# Patient Record
Sex: FEMALE | Race: WHITE | NOT HISPANIC OR LATINO | Employment: OTHER | URBAN - METROPOLITAN AREA
[De-identification: names, ages, dates, MRNs, and addresses within clinical notes are randomized per-mention and may not be internally consistent; named-entity substitution may affect disease eponyms.]

---

## 2023-11-06 ENCOUNTER — ANCILLARY PROCEDURE (OUTPATIENT)
Dept: RADIOLOGY | Facility: CLINIC | Age: 63
End: 2023-11-06
Payer: MEDICARE

## 2023-11-06 VITALS — WEIGHT: 193 LBS | BODY MASS INDEX: 29.25 KG/M2 | HEIGHT: 68 IN

## 2023-11-06 DIAGNOSIS — Z12.31 ENCOUNTER FOR SCREENING MAMMOGRAM FOR MALIGNANT NEOPLASM OF BREAST: ICD-10-CM

## 2023-11-06 DIAGNOSIS — Z13.820 ENCOUNTER FOR SCREENING FOR OSTEOPOROSIS: ICD-10-CM

## 2023-11-06 DIAGNOSIS — Z78.0 ASYMPTOMATIC MENOPAUSAL STATE: ICD-10-CM

## 2023-11-06 PROCEDURE — 77067 SCR MAMMO BI INCL CAD: CPT

## 2023-11-06 PROCEDURE — 77085 DXA BONE DENSITY AXL VRT FX: CPT

## 2023-12-04 NOTE — PROGRESS NOTES
Subjective   Patient ID: Danielle Mason is a 63 y.o. female who presents to office to discuss a colonoscopy.   HPI  Patient referred by Dr. Westbrook to discuss positive cologuard and discuss having a colonoscopy.  Patient is only done Cologuard in the past.  This is her first positive Cologuard.  She does occasionally have constipation.  No blood in the stool no abdominal pain and her weight is stable.  Her brother did have polyps.    Past Medical History:   Diagnosis Date    Hyperlipidemia     Lung granuloma (CMS/HCC)     Tinnitus       Family History   Problem Relation Name Age of Onset    Arthritis Mother Kellie     Cancer Mother Kellie     Diabetes Mother Kellie     Hypertension Mother Kellie     Kidney disease Mother Kellie     Vision loss Mother Kellie     Thyroid cancer Mother Kellie     Atrial fibrillation Father Nixon     Hearing loss Father Nixon     Kidney disease Father Nixon     Stroke Father Nixon     Kidney cancer Daughter Mandy     Breast cancer Maternal Grandmother Chela 75      Past Surgical History:   Procedure Laterality Date    CT GUIDED PERCUTANEOUS BIOPSY LUNG  01/10/2022    CT GUIDED PERCUTANEOUS BIOPSY LUNG 1/10/2022 [facility]    HYSTERECTOMY      OTHER SURGICAL HISTORY  11/10/2021    Tubal ligation      No Known Allergies   Review of Systems   Constitutional:  Negative for appetite change, fever and unexpected weight change.   HENT:  Negative for congestion and trouble swallowing.    Respiratory:  Negative for cough and shortness of breath.    Cardiovascular:  Negative for chest pain and palpitations.   Gastrointestinal:  Negative for abdominal pain, diarrhea and nausea.   Genitourinary:  Negative for difficulty urinating and dysuria.   Musculoskeletal:  Negative for back pain and gait problem.   Skin:  Negative for color change and rash.   Neurological:  Negative for dizziness, speech difficulty and headaches.   Hematological:  Does not bruise/bleed easily.    Psychiatric/Behavioral:  Negative for confusion and suicidal ideas.        Objective   Physical Exam  Physical exam generic :  GENERAL APPEARANCE: Patient appears in no acute distress.   EYES: Sclera non-icteric, PERRLA.   ENT Normal appearance of ears and nose.   NECK/THYROID: Neck: no masses. Thyroid: no masses.   LYMPH NODES: No cervical or supraclavicular lymphadenopathy.   CARDIOVASCULAR Heart: RRR, no murmurs; Carotid bruits: none; Peripheral edema: none.   RESPIRATORY: Lungs: clear to auscultation bilaterally; no respiratory distress.   GI (ABDOMEN) No intraabdominal mass appreciated, no hepatosplenomegaly; Hernia: none; Tenderness: none.   PSYCH: Patient oriented to time, place and person, normal affect.    Assessment/Plan   Problem List Items Addressed This Visit             ICD-10-CM    Abnormal stool test - Primary R19.5     Patient with positive Cologuard test.  Recommend colonoscopy.  Risk benefits alternatives of doing the colonoscopy were thoroughly discussed with the patient agrees to proceed.  The bowel prep will be reviewed by the scheduling RN         Relevant Orders    Colonoscopy Diagnostic

## 2023-12-05 ENCOUNTER — OFFICE VISIT (OUTPATIENT)
Dept: SURGERY | Facility: CLINIC | Age: 63
End: 2023-12-05
Payer: MEDICARE

## 2023-12-05 VITALS
OXYGEN SATURATION: 97 % | DIASTOLIC BLOOD PRESSURE: 90 MMHG | HEIGHT: 67 IN | SYSTOLIC BLOOD PRESSURE: 134 MMHG | HEART RATE: 74 BPM | RESPIRATION RATE: 20 BRPM | WEIGHT: 193 LBS | TEMPERATURE: 98.4 F | BODY MASS INDEX: 30.29 KG/M2

## 2023-12-05 DIAGNOSIS — R19.5 ABNORMAL STOOL TEST: Primary | ICD-10-CM

## 2023-12-05 PROCEDURE — 99214 OFFICE O/P EST MOD 30 MIN: CPT | Performed by: SURGERY

## 2023-12-05 PROCEDURE — 99204 OFFICE O/P NEW MOD 45 MIN: CPT | Performed by: SURGERY

## 2023-12-05 PROCEDURE — 1159F MED LIST DOCD IN RCRD: CPT | Performed by: SURGERY

## 2023-12-05 PROCEDURE — 1036F TOBACCO NON-USER: CPT | Performed by: SURGERY

## 2023-12-05 PROCEDURE — 1126F AMNT PAIN NOTED NONE PRSNT: CPT | Performed by: SURGERY

## 2023-12-05 RX ORDER — IBUPROFEN 200 MG
1 CAPSULE ORAL DAILY
COMMUNITY

## 2023-12-05 RX ORDER — CALCITRIOL 0.25 UG/1
0.25 CAPSULE ORAL DAILY
COMMUNITY

## 2023-12-05 RX ORDER — HYDROCHLOROTHIAZIDE 12.5 MG/1
12.5 TABLET ORAL DAILY
COMMUNITY

## 2023-12-05 RX ORDER — ROSUVASTATIN CALCIUM 10 MG/1
10 TABLET, COATED ORAL DAILY
COMMUNITY

## 2023-12-05 ASSESSMENT — PATIENT HEALTH QUESTIONNAIRE - PHQ9
SUM OF ALL RESPONSES TO PHQ9 QUESTIONS 1 & 2: 0
1. LITTLE INTEREST OR PLEASURE IN DOING THINGS: NOT AT ALL
2. FEELING DOWN, DEPRESSED OR HOPELESS: NOT AT ALL

## 2023-12-05 ASSESSMENT — ENCOUNTER SYMPTOMS
COLOR CHANGE: 0
SPEECH DIFFICULTY: 0
HEADACHES: 0
ABDOMINAL PAIN: 0
PALPITATIONS: 0
UNEXPECTED WEIGHT CHANGE: 0
DIZZINESS: 0
TROUBLE SWALLOWING: 0
COUGH: 0
FEVER: 0
NAUSEA: 0
BACK PAIN: 0
BRUISES/BLEEDS EASILY: 0
DYSURIA: 0
DIARRHEA: 0
SHORTNESS OF BREATH: 0
APPETITE CHANGE: 0
CONFUSION: 0
DIFFICULTY URINATING: 0

## 2023-12-05 ASSESSMENT — PAIN SCALES - GENERAL: PAINLEVEL: 0-NO PAIN

## 2023-12-05 NOTE — ASSESSMENT & PLAN NOTE
Patient with positive Cologuard test.  Recommend colonoscopy.  Risk benefits alternatives of doing the colonoscopy were thoroughly discussed with the patient agrees to proceed.  The bowel prep will be reviewed by the scheduling RN

## 2024-01-10 NOTE — PROGRESS NOTES
C/C:  Follow up visit    History Of Present Illness  Danielle Mason is a 63 y.o. female presenting for follow up of a lung nodule. She is a life long non-smoker.     Since last visit has been doing well, denies any respiratory issues. No recent weight loss. Patient awaiting her colonoscopy later today.     By way of review: patient was found to have a left lower lobe lung nodule incidentally on CT cardiac scoring test. She underwent follow up CT chest and PET scan at [facility], [place]. PET showed m minimal avidity in the nodule itself and no distal uptake (including lymph nodes). 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 She denies any respiratory complaints including no SOB, chest pain, or other. No recent fevers/chills or abnormal weight loss     Past Medical History  She has a past medical history of Hyperlipidemia, Lung granuloma (CMS/HCC), and Tinnitus.    Social History  She reports that she has never smoked. She has never used smokeless tobacco. She reports current alcohol use of about 2.0 standard drinks of alcohol per week. She reports that she does not use drugs.      Medications    Current Outpatient Medications:     calcitriol (Rocaltrol) 0.25 mcg capsule, Take 1 capsule (0.25 mcg) by mouth once daily., Disp: , Rfl:     calcium citrate (Calcitrate) 200 mg (950 mg) tablet, Take 1 tablet (200 mg) by mouth once daily., Disp: , Rfl:     hydroCHLOROthiazide (HYDRODiuril) 12.5 mg tablet, Take 1 tablet (12.5 mg) by mouth once daily., Disp: , Rfl:     rosuvastatin (Crestor) 10 mg tablet, Take 1 tablet (10 mg) by mouth once daily., Disp: , Rfl:     Allergies  Patient has no known allergies.    Review of Systems:  Review of Systems   Constitutional: No fevers, chills, unexpected weight change  HENT: No sore throat, congestion, or nasal drainage  Eyes: No visual changes or eye itching  Respiratory: see HPI. No cough, worsening dyspnea, wheezing  Cardiac: No chest pain,  palpitations, or lower extremity edema  Gastrointestinal: No nausea, vomiting, diarrhea. No abdominal pain  Genitourinary: No dysuria or hematuria  Musculoskeletal: No back pain. No significant myalgias or arthralgias  Neurologic: No headaches, dizziness, or seizures.  Hematologic: No east bleeding or bruising.  Psychiatric: No anxiety or depression.    Physical Exam:  Physical Exam  There were no vitals taken for this visit.  Constitutional:       General: Patient is not in acute distress.     Appearance: Normal appearance; not ill-appearing.   Neurological:      General: No focal deficit present.      Mental Status: Patient is alert and oriented to person, place, and time.   Psychiatric:         Mood and Affect: Mood normal.       Relevant Results:     Pathology:    Accession #: [id number]            Pathologist:                   WADAD S. MNEIMNEH, MD  Date of Procedure:    1/10/2022  Date Received:          1/10/2022  Date Reported           1/14/2022  Submitting Physician:   JESSICA LAUREN DO  Location:                      Copy To/Referring/Attending:  HARLEY ROSE M.D. Other External #                                        FINAL DIAGNOSIS  LUNG, LEFT LOWER LOBE, MASS, CORE BIOPSY:    -- FIBROELASTOTIC CHANGES WITH MILD INFLAMMATION AND MINUTE POORLY FORMED  NONNECROTIZING GRANULOMA (SEE NOTE).  Note: GMS and AFB stains were performed on the biopsy. No microorganisms were  identified.  The above changes are focal and are not specific. While they may be  representative of the lesion, they could be also reactive changes adjacent to  an unsampled process. Additional sampling is recommended if clinically  indicated.     Imaging:    CT chest wo IV contrast    Result Date: 1/16/2024  Interpreted By:  Conrad Corea, STUDY: CT CHEST WO IV CONTRAST;  1/15/2024 10:22 am   INDICATION: Signs/Symptoms: 1 year follow up surveilllance CT  R91.1: Lung nodule.   COMPARISON: 01/17/2023   ACCESSION NUMBER(S):  [id number]   ORDERING CLINICIAN: JESISCA LAUREN   TECHNIQUE: Helical data acquisition of the chest was obtained without IV contrast material.  Images were reformatted in axial, coronal, and sagittal planes.   FINDINGS: LUNGS AND AIRWAYS: Mild patchy bibasilar infiltrates and/or atelectasis. Unchanged subpleural lobulated posterolateral left lower lobe nodule measuring up to approximately 1.2 cm image 164, similar in size and now shows some mild internal calcification. Some adjacent abutting smaller nodular densities up to 4 mm image 158 also similar. Unchanged subcentimeter lung nodules elsewhere for example up to 3-4 mm right upper lobe image 47 and posteriorly on the right image 57. Unchanged 4 mm nodule along left major fissure image 116. Mild infiltrate or atelectasis laterally on the right similar to prior. Subcentimeter calcified lung nodules likely granulomas.   No pleural effusions.   MEDIASTINUM AND DENISE, LOWER NECK AND AXILLA: Prominent heterogeneous right thyroid lobe with apparent heterogeneous hypoattenuating nodule up to least 2.4 cm.   Mildly prominent nonspecific paratracheal nodes up to 9 mm short axis. Some prominent calcified left hilar nodes likely sequela of granulomatous disease.   Esophagus appears within normal limits as seen.   HEART AND VESSELS: The thoracic aorta is stable in course and caliber. Mild vascular calcifications.   Main pulmonary artery and its branches are normal in caliber.   No definite coronary artery calcifications are seen. The study is not optimized for evaluation of coronary arteries.   The heart appears stable in size.   No significant pericardial effusion.   UPPER ABDOMEN: Small low-density focus left hepatic lobe too small to characterize but probably of benign etiology. Prominent lamellated gas containing gallstones are redemonstrated. Mildly prominent nonspecific nodes about the descending thoracic aorta up to 6 mm short axis similar to prior.   CHEST WALL AND  "OSSEOUS STRUCTURES: Vertebral body hemangioma at the upper lumbar spine. No definite new suspicious osseous lesions. Mild multilevel degenerative changes visualized spine.       1. Unchanged lung nodules as described measuring up to 1.2 cm at the left lower lobe. Recommend continued follow-up as clinically warranted. Correlation with PET-CT may also be considered for further assessment and comparison with prior study of 11/15/2021. 2. Mild patchy bibasilar infiltrates and/or atelectasis. Mild infiltrate or atelectasis laterally on the right similar to prior. 3. Mildly prominent nonspecific mediastinal nodes as above. 4. Prominent heterogeneous right thyroid lobe with apparent heterogeneous nodule measuring up to at least 2.4 cm. Consider ultrasound for further assessment. 5. Cholelithiasis. 6. Additional findings as above.   MACRO: None   Signed by: Conrad Corea 1/16/2024 1:35 PM Dictation workstation:   [id number]       Pulmonary Functions Testing Results:    No results found for: \"FEV1\", \"FVC\", \"HYJ3PUC\", \"TLC\", \"DLCO\"    CT Chest was personally reviewed     Assessment/Plan   Problem List Items Addressed This Visit             ICD-10-CM    Lung nodule R91.1    Relevant Orders    CT chest wo IV contrast          Danielle Mason is a 63 y.o. female with incidentally discovered left lower lobe lung nodule, approx 9mm in size at the time of initial discovery - now 1.2cm but stable over the last 1+ years, minimally PET avid and prior biopsy c/w non-necrotizing granuloma. Surveillance imaging today showed stable 1.2cm size. Plan is repeat CT chest in 1 year and if it is unchanged again at that time we will discontinue surveillance.       Angeles Cruz, DO  Thoracic & Esophageal Surgery       "

## 2024-01-11 ENCOUNTER — APPOINTMENT (OUTPATIENT)
Dept: SURGERY | Facility: CLINIC | Age: 64
End: 2024-01-11
Payer: MEDICARE

## 2024-01-15 ENCOUNTER — ANCILLARY PROCEDURE (OUTPATIENT)
Dept: RADIOLOGY | Facility: CLINIC | Age: 64
End: 2024-01-15
Payer: MEDICARE

## 2024-01-15 DIAGNOSIS — R91.1 SOLITARY PULMONARY NODULE: ICD-10-CM

## 2024-01-15 PROCEDURE — 71250 CT THORAX DX C-: CPT | Performed by: RADIOLOGY

## 2024-01-15 PROCEDURE — 71250 CT THORAX DX C-: CPT

## 2024-01-16 ENCOUNTER — TELEMEDICINE (OUTPATIENT)
Dept: SURGERY | Facility: CLINIC | Age: 64
End: 2024-01-16
Payer: MEDICARE

## 2024-01-16 DIAGNOSIS — R91.1 LUNG NODULE: ICD-10-CM

## 2024-01-16 PROCEDURE — 99442 PR PHYS/QHP TELEPHONE EVALUATION 11-20 MIN: CPT | Performed by: STUDENT IN AN ORGANIZED HEALTH CARE EDUCATION/TRAINING PROGRAM

## 2024-01-19 PROBLEM — R91.1 LUNG NODULE: Status: ACTIVE | Noted: 2024-01-19

## 2024-11-06 ASSESSMENT — ENCOUNTER SYMPTOMS
FREQUENCY: 1
CONSTIPATION: 1

## 2024-11-08 ENCOUNTER — APPOINTMENT (OUTPATIENT)
Dept: OBSTETRICS AND GYNECOLOGY | Facility: CLINIC | Age: 64
End: 2024-11-08
Payer: MEDICARE

## 2024-11-08 VITALS
BODY MASS INDEX: 29.1 KG/M2 | DIASTOLIC BLOOD PRESSURE: 74 MMHG | WEIGHT: 192 LBS | SYSTOLIC BLOOD PRESSURE: 112 MMHG | HEIGHT: 68 IN

## 2024-11-08 DIAGNOSIS — R35.0 URINE FREQUENCY: ICD-10-CM

## 2024-11-08 DIAGNOSIS — N81.6 RECTOCELE: Primary | ICD-10-CM

## 2024-11-08 LAB
POC BLOOD, URINE: NEGATIVE
POC GLUCOSE, URINE: NEGATIVE MG/DL
POC LEUKOCYTES, URINE: NEGATIVE
POC NITRITE,URINE: NEGATIVE
POC PROTEIN, URINE: NEGATIVE MG/DL

## 2024-11-08 PROCEDURE — 3008F BODY MASS INDEX DOCD: CPT | Performed by: OBSTETRICS & GYNECOLOGY

## 2024-11-08 PROCEDURE — 81002 URINALYSIS NONAUTO W/O SCOPE: CPT | Performed by: OBSTETRICS & GYNECOLOGY

## 2024-11-08 PROCEDURE — 99214 OFFICE O/P EST MOD 30 MIN: CPT | Performed by: OBSTETRICS & GYNECOLOGY

## 2024-11-08 PROCEDURE — 1159F MED LIST DOCD IN RCRD: CPT | Performed by: OBSTETRICS & GYNECOLOGY

## 2024-11-08 ASSESSMENT — ENCOUNTER SYMPTOMS
FREQUENCY: 1
CONSTIPATION: 1

## 2024-11-08 NOTE — PROGRESS NOTES
Subjective   Patient ID: Danielle Mason is a 64 y.o. female who presents for Consult.  Established patient 64 years old.  3 children born vaginally.  Largest 9 pounds.  March 2022 she had a vaginal hysterectomy with anterior colporrhaphy NuPrep presents with 6 weeks of pelvic pressure.  She does have constipation.  Can feel a bulge.  She states that she also has urinary frequency.  NuPrep on exam there is a grade 2-3 posterior vaginal wall prolapse consistent with rectocele.    Review diagrams and etiology.  Review options.  Discussed that pelvic organ prolapse is common after multiple children especially 1 weighing 9 pounds.  On exam today there is excellent support of the vault of the vagina and bladder.  This is mainly a posterior vaginal wall prolapse.  Review options including observation, pessary, pelvic floor physical therapy, surgical correction    This point would recommend pelvic floor physical therapy.  Follow-up in 8 weeks    Female  Problem  The patient's primary symptoms include genital itching and pelvic pain. This is a new problem. The current episode started more than 1 month ago. The problem occurs constantly. The problem has been unchanged. The pain is mild. The problem affects both sides. She is not pregnant. Associated symptoms include constipation, frequency and urgency. The symptoms are aggravated by activity and bowel movements. She is sexually active. No, her partner does not have an STD. She uses tubal ligation for contraception. She is postmenopausal.       Review of Systems   Gastrointestinal:  Positive for constipation.   Genitourinary:  Positive for frequency, pelvic pain and urgency.   All other systems reviewed and are negative.      Objective   Physical Exam  Genitourinary:     General: Normal vulva.      Vagina: Prolapsed vaginal walls present.      Comments: Grade 2-3 rectocele.  Prior vaginal hysterectomy with anterior colporrhaphy.  Excellent apical and anterior wall  support        Assessment/Plan   Trial of pelvic floor physical therapy.  Follow-up 8 weeks         Parag Timmons MD 11/08/24 9:04 AM

## 2024-12-03 ENCOUNTER — EVALUATION (OUTPATIENT)
Dept: PHYSICAL THERAPY | Facility: CLINIC | Age: 64
End: 2024-12-03
Payer: MEDICARE

## 2024-12-03 DIAGNOSIS — R35.0 URINE FREQUENCY: ICD-10-CM

## 2024-12-03 DIAGNOSIS — N81.6 RECTOCELE: ICD-10-CM

## 2024-12-03 PROCEDURE — 97530 THERAPEUTIC ACTIVITIES: CPT | Mod: GP | Performed by: PHYSICAL THERAPIST

## 2024-12-03 PROCEDURE — 97162 PT EVAL MOD COMPLEX 30 MIN: CPT | Mod: GP | Performed by: PHYSICAL THERAPIST

## 2024-12-03 PROCEDURE — 97110 THERAPEUTIC EXERCISES: CPT | Mod: GP | Performed by: PHYSICAL THERAPIST

## 2024-12-03 NOTE — PROGRESS NOTES
Physical Therapy Pelvic Floor Evaluation    Patient Name: Danielle Mason  MRN: [id number]  Evaluation Date: 12/3/2024  Time Calculation  Start Time: 0800  Stop Time: 0900  Time Calculation (min): 60 min  PT Evaluation Time Entry  PT Evaluation (Moderate) Time Entry: 30     PT Therapeutic Procedures Time Entry  Therapeutic Exercise Time Entry: 13  Therapeutic Activity Time Entry: 10         Problem List Items Addressed This Visit             ICD-10-CM    Urine frequency R35.0    Rectocele N81.6        Subjective    Precautions:   No precautions    Pain:     2/10 pain vaginal     PELVIC HISTORY:  Chief Complaint/Description of Symptoms:   Noticed September 2024, began feeling pressure vaginally-- pain, pressure, constipation and urinary frequency.  PCP thought UTI but antibiotics didn't help.  Saw. Dr. Timmons and said rectocele and referred to PFPT prior to surgery.  Currently reports she feels a bulge... more uncomfortable as day goes on.     Past Medical History:    Hysterectomy because uterus prolapse, HTN,   Home Environment/Social Factors/Occupation:   Retired teacher, used to be caregiver to parents.    Patient Primary Goal:   To reduce pain, strengthen pelvic wall and prevent surgery    PELVIC PAIN:     Location: vaginal area-- arch, soreness  2/10  currently.  Better since initial diagnosis  Soreness and pain is worse after intercourse  No lubrication,     MENSTRUAL HISTORY:  Post menopausal    OB HISTORY:  3 -- vaginal deliveries, tore during delivery and had episiotomy;      BLADDER:     Daytime Voiding Frequency: at least 10 times a day  Nighttime Voiding Frequency: usually wakes 2 times   Unintentional urine loss: yes   Leakage occurs with: when waits too long, sneezing, coughing   Leakage amount: small to moderate  Able to completely empty bladder: pees then stops and then leans forward to empty more.    BOWEL:     BM Frequency: 1-3 times a day.  Frequent constipation/straining/incomplete  emptying: constipation, trying to avoid straining.     FLUID/DIET INTAKE:  Drinks water --  Standard american diet    EXERCISE:  Current exercise regime:   Does walk when weather is good, does go to [employer] and treadmill,     Objective   POSTURE/ALIGNMENT:  Reduced lumbar lordosis, + stork with SLS on right;        ROM:  Lumbar ROM Range   Flexion 75%   Extension 50%      MMT:  Hip MMT L R   Hip Flexion 4/5 4/5   Hip Extension 4-/5 4-/5   Hip Abduction 4/5 4/5      TA: poor TA activation with exhale, poor load transfer with active SLR,   Difficulty coordinating breathing     FLEXIBILITY R/L:  Hamstrings: tightness/tightness    PELVIC FLOOR  Patient Position:  hooklying  EXTERNAL OBSERVATION:  Voluntary Contraction: difficulty coordinating contraction   Vulvar Assessment: redness and dry -- no vaginal moisturizer or estogen use    EXTERNAL PALPATION:  Levator Ani: non tender   Bulbo: non tender   Ischiocavernosus: non tender   Transverse perineum: non tender     INTERNAL VAGINAL EXAMINATION WITH PATIENT CONSENT:  Patient position:  hooklying  Internal:  Deep layer:  Left pubococcygeus and iliococcygeus more tender than right  Muscle Excursion  Limited   Strength  2/5  Prolapse  Rectocele 3    Outcome Measures:  NIH-CPSI: 29  Pain: 12  Urinary: 10  Quality of Life Impact: 7     Treatments:  Therapeutic Activity:    Breathing and importance of to pelvic floor  Fiber, and stool softeners  Laying with pillow under buttock to reduce prolapse  Discussed lubricant for intercourse  Therapeutic Exercise:    Exhale with effort  Kegel with exhale   Bridging with exhale   Access Code: [email or web address]  URL: [email or web address]  Date: 12/03/2024  Prepared by: Karina Saenz    Exercises  - Supine Bridge  - 1 x daily - 7 x weekly - 1 sets - 10 reps  - Supine Pelvic Floor Contraction  - 1 x daily - 7 x weekly - 1 sets - 10 reps    OP EDUCATION:  Outpatient Education  Individual(s) Educated: Patient  Education Provided: Anatomy, Home  Exercise Program, POC, Physiology  Risk and Benefits Discussed with Patient/Caregiver/Other: yes  Patient/Caregiver Demonstrated Understanding: yes  Plan of Care Discussed and Agreed Upon: yes  Patient Response to Education: Patient/Caregiver Verbalized Understanding of Information, Patient/Caregiver Performed Return Demonstration of Exercises/Activities, Patient/Caregiver Asked Appropriate Questions    Assessment   PT Assessment Results: Decreased strength, Decreased range of motion, Decreased coordination, Decreased endurance, Pain  Rehab Prognosis: Good  Evaluation/Treatment Tolerance: Patient tolerated treatment well    Pt is a 64 y.o. female who presents with impairments of weakness, pain and impaired coordination with breathing. These impairments have led to functional limitations including rectocele with ADLs and constipation and urinary frequency. Pt would benefit from skilled physical therapy intervention to improve above impairments and facilitate return to function.     Complexity of Evaluation: Moderate    Based on the history including personal factors and/or comorbidities, examination of body systems including body structures and function, activity limitations, and/or participation restrictions, as well as clinical presentation, patient meets criteria for a Moderate complexity evaluation.    Plan:  Treatment/Interventions: Biofeedback, Education/ Instruction, Electrical stimulation, Manual therapy, Neuromuscular re-education, Therapeutic activities, Therapeutic exercises  PT Plan: Skilled PT  PT Frequency: 1 time per week  Duration: 10 sessions  Certification Period Start Date: 12/03/24  Certification Period End Date: 03/03/25  Number of Treatments Authorized: medical necessity  Rehab Potential: Good  Plan of Care Agreement: Patient    Insurance Plan: Payor: [employer] MEDICARE / Plan: [employer]ICARE / Product Type: *No Product type* /     Next session:  strengthening    Goals:     Pelvic Floor      STGs - 5 sessions  1)  Patient will report 25% less pain/symptoms at end of day  2)  Patient will perform diaphragmatic breathing properly to relax and contract pelvic floor muscle appropriately  3)  Patient will demonstrate good transverse abdominis activation to stabilize lumbopelvic girdle during ADLs  4)  Patient will report successful use of urge suppression strategies at least 50% of the time      LTGs - 10 sessions  1)  Patient will report improved sleep with less waking to void (will only wake 1-2 times a night)   2)  Patient will increase tolerance to internal penetration for examination and intercourse with min to nil pain   3)  Patient will reduce urine loss to nil during coughing and sneezing  4)  Patient will be able to have complete bowel movement without constipation or straining 75% of the time  5)  Patient will improve pelvic floor contraction to by 1/2-1 MMT  with coordinated exhale for improved continence and reduction of rectocele  6)  Patient will score 15 points or less on NIH-CPSI to indicate perceived improvement.

## 2024-12-10 ENCOUNTER — APPOINTMENT (OUTPATIENT)
Dept: PHYSICAL THERAPY | Facility: CLINIC | Age: 64
End: 2024-12-10
Payer: MEDICARE

## 2024-12-18 ENCOUNTER — TREATMENT (OUTPATIENT)
Dept: PHYSICAL THERAPY | Facility: CLINIC | Age: 64
End: 2024-12-18
Payer: MEDICARE

## 2024-12-18 DIAGNOSIS — R35.0 URINE FREQUENCY: ICD-10-CM

## 2024-12-18 DIAGNOSIS — N81.6 RECTOCELE: ICD-10-CM

## 2024-12-18 PROCEDURE — 97530 THERAPEUTIC ACTIVITIES: CPT | Mod: GP | Performed by: PHYSICAL THERAPIST

## 2024-12-18 PROCEDURE — 97110 THERAPEUTIC EXERCISES: CPT | Mod: GP | Performed by: PHYSICAL THERAPIST

## 2024-12-18 NOTE — PROGRESS NOTES
Physical Therapy Treatment    Patient Name: Danielle Mason  MRN: [id number]  Encounter date:  12/18/2024  Time Calculation  Start Time: 1130  Stop Time: 1228  Time Calculation (min): 58 min     PT Therapeutic Procedures Time Entry  Therapeutic Exercise Time Entry: 35  Therapeutic Activity Time Entry: 20    Visit Number:  2 (including evaluation)  Planned total visits: 10 per POC  Visits Authorized/Insurance Coverage:  11/26/2024: NO AUTH, 96% [place], [place], [employer]     Current Problem  Problem List Items Addressed This Visit             ICD-10-CM    Urine frequency R35.0    Rectocele N81.6     Precautions     No precautions    Pain     3-4/10 pain irritated    Subjective  General        Feels some soreness, dryness;  Patient also reporting breathing is challenging.      Objective  Cues for breathing     Treatment:  Therapuetic Activity:    Vaginal moisturizer -- list issued of brands  Ways to reduce constipation  Therapeutic Exercise:  with coordinated kegel and breathing  Hip adduction x 10   Hip abduction x 10   Bridging x 10   Supine MIP x 10   Heel raises x 10   Shoulder flexion to 90 x 10   Happy baby modified x 3 x 3 -4 breathes  SKTC x 3 3 breathes  Wide LTR x 10 (cues to limit range of motion)      Current HEP:  Access Code: [email or web address]  URL: [email or web address]  Date: 12/18/2024  Prepared by: Karina Saenz    Exercises  - Pelvic Floor Contractions in Hooklying with Adduction  - 1 x daily - 7 x weekly - 1 sets - 10 reps  - Pelvic Floor Contractions in Hooklying with Resisted Abduction  - 1 x daily - 7 x weekly - 1 sets - 10 reps  - Supine March  - 1 x daily - 7 x weekly - 1 sets - 10 reps  - Supine stretch-- hands on knees  - 1 x daily - 7 x weekly - 1 sets - 3 reps - 3-4 breathes hold  - Single Knee to Chest Stretch  - 1 x daily - 7 x weekly - 1 sets - 3 reps - 3-4 breathes hold  - Supine Hip Internal and External Rotation  - 1 x daily - 7 x weekly - 1 sets - 10 reps  - Heel Raises with Counter  Support  - 1 x daily - 7 x weekly - 1 sets - 10 reps  - Standing Shoulder Flexion to 90 Degrees with Dumbbells  - 1 x daily - 7 x weekly - 1 sets - 10 reps  Access Code: [email or web address]  URL: [email or web address]  Date: 12/03/2024  Prepared by: Karina Saenz     Exercises  - Supine Bridge  - 1 x daily - 7 x weekly - 1 sets - 10 reps  - Supine Pelvic Floor Contraction  - 1 x daily - 7 x weekly - 1 sets - 10 reps    Has patient been compliant with HEP? Yes    OP EDUCATION:     Updated HEP    Assessment:     Pt's response to treatment:  Patient feeling ability to contract pelvic floor more post session.  Pain may be related to friction from slipping of rectocele.  Patient is encouraged to try vaginal moisturizer.  Patient  to speak to GI regarding constipation. Patient considering seeking second opinion regarding need for surgery.      Pain end of session: 2    Plan:     Continue with current POC/no changes    Assessment of current progress against goals:  Progressing toward functional goals    Goals:     Pelvic Floor     STGs - 5 sessions  1)  Patient will report 25% less pain/symptoms at end of day  2)  Patient will perform diaphragmatic breathing properly to relax and contract pelvic floor muscle appropriately  3)  Patient will demonstrate good transverse abdominis activation to stabilize lumbopelvic girdle during ADLs  4)  Patient will report successful use of urge suppression strategies at least 50% of the time      LTGs - 10 sessions  1)  Patient will report improved sleep with less waking to void (will only wake 1-2 times a night)   2)  Patient will increase tolerance to internal penetration for examination and intercourse with min to nil pain   3)  Patient will reduce urine loss to nil during coughing and sneezing  4)  Patient will be able to have complete bowel movement without constipation or straining 75% of the time  5)  Patient will improve pelvic floor contraction to by 1/2-1 MMT  with coordinated  exhale for improved continence and reduction of rectocele  6)  Patient will score 15 points or less on NIH-CPSI to indicate perceived improvement.

## 2025-01-02 ENCOUNTER — HOSPITAL ENCOUNTER (OUTPATIENT)
Dept: RADIOLOGY | Facility: CLINIC | Age: 65
Discharge: HOME | End: 2025-01-02
Payer: MEDICARE

## 2025-01-02 DIAGNOSIS — Z12.31 ENCOUNTER FOR SCREENING MAMMOGRAM FOR MALIGNANT NEOPLASM OF BREAST: ICD-10-CM

## 2025-01-02 PROCEDURE — 77063 BREAST TOMOSYNTHESIS BI: CPT | Performed by: RADIOLOGY

## 2025-01-02 PROCEDURE — 77067 SCR MAMMO BI INCL CAD: CPT

## 2025-01-02 PROCEDURE — 77067 SCR MAMMO BI INCL CAD: CPT | Performed by: RADIOLOGY

## 2025-01-03 ENCOUNTER — TREATMENT (OUTPATIENT)
Dept: PHYSICAL THERAPY | Facility: CLINIC | Age: 65
End: 2025-01-03
Payer: MEDICARE

## 2025-01-03 DIAGNOSIS — R35.0 URINE FREQUENCY: ICD-10-CM

## 2025-01-03 DIAGNOSIS — N81.6 RECTOCELE: ICD-10-CM

## 2025-01-03 PROCEDURE — 97110 THERAPEUTIC EXERCISES: CPT | Mod: GP | Performed by: PHYSICAL THERAPIST

## 2025-01-03 PROCEDURE — 97530 THERAPEUTIC ACTIVITIES: CPT | Mod: GP | Performed by: PHYSICAL THERAPIST

## 2025-01-03 NOTE — PROGRESS NOTES
Physical Therapy Treatment    Patient Name: Danielle Mason  MRN: [id number]  Encounter date:  1/3/2025  Time Calculation  Start Time: 0900  Stop Time: 0955  Time Calculation (min): 55 min     PT Therapeutic Procedures Time Entry  Therapeutic Exercise Time Entry: 30  Therapeutic Activity Time Entry: 23    Visit Number:  3 (including evaluation)  Planned total visits: 10 per POC  Visits Authorized/Insurance Coverage:  11/26/2024: NO AUTH, 96% [place], [employer]     Current Problem  Problem List Items Addressed This Visit             ICD-10-CM    Urine frequency R35.0    Rectocele N81.6       Precautions     No precautions    Pain     3-4/10 pressure (more annoying)     Subjective  General        Hasn't been dedicated to doing exercises.  Still feels pressure but not as much pain. Feels a lot of pressure to urinate especially at night;  using vaginal moisturizer and this is helpful.      Objective  Cues for breathing     Treatment:  Therapuetic Activity:    Discussed proper elimination position to assist with bowel evacuation.  Continued to discuss dietary habits and changes for proper bowel movements.    Therapeutic Exercise:  with coordinated kegel and breathing  Hip adduction x 10 (supine and sitting)   Hip abduction x 10 mint green (supine and sitting)  Bridging x 10   Heel raises x 10 (challenged with kegel in standing)  Kegel x 10        Current HEP:  Access Code: [email or web address]  URL: [email or web address]  Date: 12/18/2024  Prepared by: Karina Saenz    Exercises  - Pelvic Floor Contractions in Hooklying with Adduction  - 1 x daily - 7 x weekly - 1 sets - 10 reps  - Pelvic Floor Contractions in Hooklying with Resisted Abduction  - 1 x daily - 7 x weekly - 1 sets - 10 reps  - Supine March  - 1 x daily - 7 x weekly - 1 sets - 10 reps  - Supine stretch-- hands on knees  - 1 x daily - 7 x weekly - 1 sets - 3 reps - 3-4 breathes hold  - Single Knee to Chest Stretch  - 1 x daily - 7 x weekly - 1 sets - 3 reps  - 3-4 breathes hold  - Supine Hip Internal and External Rotation  - 1 x daily - 7 x weekly - 1 sets - 10 reps  - Heel Raises with Counter Support  - 1 x daily - 7 x weekly - 1 sets - 10 reps  - Standing Shoulder Flexion to 90 Degrees with Dumbbells  - 1 x daily - 7 x weekly - 1 sets - 10 reps  Access Code: [email or web address]  URL: [email or web address]  Date: 12/03/2024  Prepared by: Karina Saenz     Exercises  - Supine Bridge  - 1 x daily - 7 x weekly - 1 sets - 10 reps  - Supine Pelvic Floor Contraction  - 1 x daily - 7 x weekly - 1 sets - 10 reps    Has patient been compliant with HEP? Yes    OP EDUCATION:     Updated HEP    Assessment:     Pt's response to treatment: Patient frustrated and just feels like she wants to have the surgery to improve prolapse/cystocele.  Non compliant with HEP.  Patient has appointment with Dr. Timmons next week to discuss.  More adherent to techniques for constipation as understands that constipation can worsen prolapse/cystocele.  Patient to contact PT post appointment with Dr. Timmons.      Pain end of session: 2    Plan:     Continue with current POC/no changes    Assessment of current progress against goals:  Progressing toward functional goals    Goals:     Pelvic Floor     STGs - 5 sessions  1)  Patient will report 25% less pain/symptoms at end of day  2)  Patient will perform diaphragmatic breathing properly to relax and contract pelvic floor muscle appropriately  3)  Patient will demonstrate good transverse abdominis activation to stabilize lumbopelvic girdle during ADLs  4)  Patient will report successful use of urge suppression strategies at least 50% of the time      LTGs - 10 sessions  1)  Patient will report improved sleep with less waking to void (will only wake 1-2 times a night)   2)  Patient will increase tolerance to internal penetration for examination and intercourse with min to nil pain   3)  Patient will reduce urine loss to nil during coughing and sneezing  4)   Patient will be able to have complete bowel movement without constipation or straining 75% of the time  5)  Patient will improve pelvic floor contraction to by 1/2-1 MMT  with coordinated exhale for improved continence and reduction of rectocele  6)  Patient will score 15 points or less on NIH-CPSI to indicate perceived improvement.

## 2025-01-07 ENCOUNTER — APPOINTMENT (OUTPATIENT)
Dept: PHYSICAL THERAPY | Facility: CLINIC | Age: 65
End: 2025-01-07
Payer: MEDICARE

## 2025-01-08 ENCOUNTER — APPOINTMENT (OUTPATIENT)
Dept: OBSTETRICS AND GYNECOLOGY | Facility: CLINIC | Age: 65
End: 2025-01-08
Payer: MEDICARE

## 2025-01-08 VITALS
HEIGHT: 68 IN | WEIGHT: 198 LBS | DIASTOLIC BLOOD PRESSURE: 84 MMHG | BODY MASS INDEX: 30.01 KG/M2 | SYSTOLIC BLOOD PRESSURE: 140 MMHG

## 2025-01-08 DIAGNOSIS — N81.83 WEAKENING OF RECTOVAGINAL TISSUE: Primary | ICD-10-CM

## 2025-01-08 PROCEDURE — 1159F MED LIST DOCD IN RCRD: CPT | Performed by: OBSTETRICS & GYNECOLOGY

## 2025-01-08 PROCEDURE — 3008F BODY MASS INDEX DOCD: CPT | Performed by: OBSTETRICS & GYNECOLOGY

## 2025-01-08 PROCEDURE — 99213 OFFICE O/P EST LOW 20 MIN: CPT | Performed by: OBSTETRICS & GYNECOLOGY

## 2025-01-08 ASSESSMENT — ENCOUNTER SYMPTOMS: CONSTIPATION: 1

## 2025-01-08 NOTE — PROGRESS NOTES
Subjective   Patient ID: Danielle Mason is a 64 y.o. female who presents for Follow-up.  Following up on posterior wall prolapse that is somewhat symptomatic causing some discomfort and soreness.  As she has been doing pelvic floor physical therapy.  It has helped on many levels.  Feels her constipation is better.  She is concerned that her daughter is having a baby in May and she would like to be able to help.  Overall very happy with the pelvic floor physical therapy.  I did suggest that we wait another 2 months to see if her symptoms improve.  If not we can consider surgical correction.  Follow-up in May.  Prior vaginal hysterectomy and anterior colporrhaphy.  Known posterior wall prolapse.  Discussed that constipation may not be related to her prolapse.    Review of my last note     Parag Timmons MD  Physician  Obstetrics     Progress Notes     Signed     Encounter Date: 11/8/2024    Signed     Expand All Collapse All       Subjective  Patient ID: Danielle Mason is a 64 y.o. female who presents for Consult.  Established patient 64 years old.  3 children born vaginally.  Largest 9 pounds.  March 2022 she had a vaginal hysterectomy with anterior colporrhaphy NuPrep presents with 6 weeks of pelvic pressure.  She does have constipation.  Can feel a bulge.  She states that she also has urinary frequency.  NuPrep on exam there is a grade 2-3 posterior vaginal wall prolapse consistent with rectocele.     Review diagrams and etiology.  Review options.  Discussed that pelvic organ prolapse is common after multiple children especially 1 weighing 9 pounds.  On exam today there is excellent support of the vault of the vagina and bladder.  This is mainly a posterior vaginal wall prolapse.  Review options including observation, pessary, pelvic floor physical therapy, surgical correction     This point would recommend pelvic floor physical therapy.  Follow-up in 8 weeks     Female  Problem  The patient's primary  symptoms include genital itching and pelvic pain. This is a new problem. The current episode started more than 1 month ago. The problem occurs constantly. The problem has been unchanged. The pain is mild. The problem affects both sides. She is not pregnant. Associated symptoms include constipation, frequency and urgency. The symptoms are aggravated by activity and bowel movements. She is sexually active. No, her partner does not have an STD. She uses tubal ligation for contraception. She is postmenopausal.         Review of Systems   Gastrointestinal:  Positive for constipation.   Genitourinary:  Positive for frequency, pelvic pain and urgency.   All other systems reviewed and are negative.           Objective  Physical Exam  Genitourinary:     General: Normal vulva.      Vagina: Prolapsed vaginal walls present.      Comments: Grade 2-3 rectocele.  Prior vaginal hysterectomy with anterior colporrhaphy.  Excellent apical and anterior wall support              Assessment/Plan  Trial of pelvic floor physical therapy.  Follow-up 8 weeks        Parag Timmons MD 11/08/24 9:04 AM           Impression is pelvic organ prolapse.  Follow-up in March for exam and consideration of surgical correction.  Depending on complexity of the findings will determine whether I do the repair or we refer to urogynecology.  Continue pelvic floor physical therapy        Review of Systems   Gastrointestinal:  Positive for constipation.       Objective   Physical Exam  Constitutional:       Appearance: Normal appearance.   Neurological:      Mental Status: She is alert.         Assessment/Plan   Continue pelvic floor physical therapy for posterior wall prolapse.  Follow-up in March.  Will perform exam and determine if surgical correction is appropriate         Parag Timmons MD 01/08/25 11:16 AM

## 2025-01-13 ENCOUNTER — APPOINTMENT (OUTPATIENT)
Dept: PHYSICAL THERAPY | Facility: CLINIC | Age: 65
End: 2025-01-13
Payer: MEDICARE

## 2025-01-15 ENCOUNTER — APPOINTMENT (OUTPATIENT)
Dept: RADIOLOGY | Facility: CLINIC | Age: 65
End: 2025-01-15
Payer: MEDICARE

## 2025-01-17 ENCOUNTER — HOSPITAL ENCOUNTER (OUTPATIENT)
Dept: RADIOLOGY | Facility: HOSPITAL | Age: 65
Discharge: HOME | End: 2025-01-17
Payer: MEDICARE

## 2025-01-17 DIAGNOSIS — R91.1 LUNG NODULE: ICD-10-CM

## 2025-01-17 PROCEDURE — 71250 CT THORAX DX C-: CPT

## 2025-01-20 ENCOUNTER — APPOINTMENT (OUTPATIENT)
Dept: PHYSICAL THERAPY | Facility: CLINIC | Age: 65
End: 2025-01-20
Payer: MEDICARE

## 2025-01-21 ENCOUNTER — TELEMEDICINE (OUTPATIENT)
Dept: SURGERY | Facility: CLINIC | Age: 65
End: 2025-01-21
Payer: MEDICARE

## 2025-01-21 DIAGNOSIS — R91.1 LUNG NODULE: Primary | ICD-10-CM

## 2025-01-21 PROCEDURE — 1159F MED LIST DOCD IN RCRD: CPT | Performed by: STUDENT IN AN ORGANIZED HEALTH CARE EDUCATION/TRAINING PROGRAM

## 2025-01-21 PROCEDURE — 1160F RVW MEDS BY RX/DR IN RCRD: CPT | Performed by: STUDENT IN AN ORGANIZED HEALTH CARE EDUCATION/TRAINING PROGRAM

## 2025-01-21 PROCEDURE — 99214 OFFICE O/P EST MOD 30 MIN: CPT | Performed by: STUDENT IN AN ORGANIZED HEALTH CARE EDUCATION/TRAINING PROGRAM

## 2025-01-21 NOTE — PROGRESS NOTES
C/C:  Lung nodule surveillance, virtual    Virtual or Telephone Consent    An interactive audio and video telecommunication system which permits real time communications between the patient (at the originating site) and provider (at the distant site) was utilized to provide this telehealth service.   Verbal consent was requested and obtained from Danielle Mason on this date, 01/21/25 for a telehealth visit.         History Of Present Illness  Danielle Mason is a 64 y.o. female presenting for follow up of a lung nodule. She is a life long non-smoker.     Since last visit has been doing well, denies any respiratory issues. No recent weight loss. No new cough, fevers, chills or other.     By way of review: patient was found to have a left lower lobe lung nodule incidentally on CT cardiac scoring test. She underwent follow up CT chest and PET scan at [facility], operated by [facility]. PET showed m minimal avidity in the nodule itself and no distal uptake (including lymph nodes). 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 She denies any respiratory complaints including no SOB, chest pain, or other. No recent fevers/chills or abnormal weight loss     Past Medical History  She has a past medical history of Hyperlipidemia, Lung granuloma (Multi), and Tinnitus.    Social History  She reports that she has never smoked. She has never used smokeless tobacco. She reports current alcohol use of about 2.0 standard drinks of alcohol per week. She reports that she does not use drugs.      Medications    Current Outpatient Medications:     calcitriol (Rocaltrol) 0.25 mcg capsule, Take 1 capsule (0.25 mcg) by mouth once daily., Disp: , Rfl:     calcium citrate (Calcitrate) 200 mg (950 mg) tablet, Take 1 tablet (200 mg) by mouth once daily., Disp: , Rfl:     hydroCHLOROthiazide (HYDRODiuril) 12.5 mg tablet, Take 1 tablet (12.5 mg) by mouth once daily., Disp: , Rfl:     rosuvastatin (Crestor) 10 mg tablet, Take  1 tablet (10 mg) by mouth once daily., Disp: , Rfl:     Allergies  Patient has no known allergies.    Review of Systems:  Review of Systems   Constitutional: No fevers, chills, unexpected weight change  HENT: No sore throat, congestion, or nasal drainage  Eyes: No visual changes or eye itching  Respiratory: see HPI. No cough, worsening dyspnea, wheezing  Cardiac: No chest pain, palpitations, or lower extremity edema  Gastrointestinal: No nausea, vomiting, diarrhea. No abdominal pain  Genitourinary: No dysuria or hematuria  Musculoskeletal: No back pain. No significant myalgias or arthralgias  Neurologic: No headaches, dizziness, or seizures.  Hematologic: No east bleeding or bruising.  Psychiatric: No anxiety or depression.    Physical Exam:  Physical Exam  There were no vitals taken for this visit.  Constitutional:       General: Patient is not in acute distress.     Appearance: Normal appearance; not ill-appearing.   Neurological:      General: No focal deficit present.      Mental Status: Patient is alert and oriented to person, place, and time.   Psychiatric:         Mood and Affect: Mood normal.       Relevant Results:     Pathology:    Accession #: [id number]            Pathologist:                   WADAD S. MNEIMNEH, MD  Date of Procedure:    1/10/2022  Date Received:          1/10/2022  Date Reported           1/14/2022  Submitting Physician:   JESSICA LAUREN DO  Location:                      Copy To/Referring/Attending:  HARLEY ROSE M.D. Other External #                                        FINAL DIAGNOSIS  LUNG, LEFT LOWER LOBE, MASS, CORE BIOPSY:    -- FIBROELASTOTIC CHANGES WITH MILD INFLAMMATION AND MINUTE POORLY FORMED  NONNECROTIZING GRANULOMA (SEE NOTE).  Note: GMS and AFB stains were performed on the biopsy. No microorganisms were  identified.  The above changes are focal and are not specific. While they may be  representative of the lesion, they could be also reactive changes adjacent  "to  an unsampled process. Additional sampling is recommended if clinically  indicated.     Imaging:     Pulmonary Functions Testing Results:    No results found for: \"FEV1\", \"FVC\", \"EJN5UQB\", \"TLC\", \"DLCO\"    CT Chest was personally reviewed     Assessment/Plan   Problem List Items Addressed This Visit             ICD-10-CM    Lung nodule - Primary R91.1            Danielle Mason is a 64 y.o. female, never smoker, with incidentally discovered left lower lobe lung nodule, which has now been stable over several years. Was previously minimally PET avid and prior biopsy c/w non-necrotizing granuloma. Ongoing stability in size on recent CT chest and no new nodules, we will therefore discontinue surveillance.     Time spent 25 min  Angeles Cruz, DO  Thoracic & Esophageal Surgery       "

## 2025-01-27 ENCOUNTER — APPOINTMENT (OUTPATIENT)
Dept: PHYSICAL THERAPY | Facility: CLINIC | Age: 65
End: 2025-01-27
Payer: MEDICARE

## 2025-02-03 ENCOUNTER — APPOINTMENT (OUTPATIENT)
Dept: PHYSICAL THERAPY | Facility: CLINIC | Age: 65
End: 2025-02-03
Payer: MEDICARE

## 2025-02-07 ENCOUNTER — DOCUMENTATION (OUTPATIENT)
Dept: PHYSICAL THERAPY | Facility: CLINIC | Age: 65
End: 2025-02-07
Payer: MEDICARE

## 2025-02-07 NOTE — PROGRESS NOTES
Physical Therapy    Discharge Summary    Name: Danielle Mason  MRN: [id number]  : 1960  Date: 25    Discharge Summary: PT    Discharge Information: Date of discharge 25, Date of last visit 1/3/25, Date of evaluation 12/3/24, and Number of attended visits 3    Therapy Summary: Patient cancelled all sessions.      Discharge Status: unknown.       Rehab Discharge Reason: Failed to schedule and/or keep follow-up appointment(s)

## 2025-02-10 ENCOUNTER — APPOINTMENT (OUTPATIENT)
Dept: PHYSICAL THERAPY | Facility: CLINIC | Age: 65
End: 2025-02-10
Payer: MEDICARE

## 2025-03-12 ENCOUNTER — APPOINTMENT (OUTPATIENT)
Dept: OBSTETRICS AND GYNECOLOGY | Facility: CLINIC | Age: 65
End: 2025-03-12
Payer: MEDICARE

## 2025-03-12 VITALS
WEIGHT: 196 LBS | HEIGHT: 68 IN | SYSTOLIC BLOOD PRESSURE: 126 MMHG | BODY MASS INDEX: 29.7 KG/M2 | DIASTOLIC BLOOD PRESSURE: 76 MMHG

## 2025-03-12 DIAGNOSIS — K46.9 FEMALE RECTOCELE WITH ENTEROCELE: Primary | ICD-10-CM

## 2025-03-12 DIAGNOSIS — N81.6 FEMALE RECTOCELE WITH ENTEROCELE: Primary | ICD-10-CM

## 2025-03-12 PROCEDURE — 99214 OFFICE O/P EST MOD 30 MIN: CPT | Performed by: OBSTETRICS & GYNECOLOGY

## 2025-03-12 PROCEDURE — 1159F MED LIST DOCD IN RCRD: CPT | Performed by: OBSTETRICS & GYNECOLOGY

## 2025-03-12 PROCEDURE — 3008F BODY MASS INDEX DOCD: CPT | Performed by: OBSTETRICS & GYNECOLOGY

## 2025-03-12 ASSESSMENT — ENCOUNTER SYMPTOMS: CONSTIPATION: 1

## 2025-03-12 NOTE — PROGRESS NOTES
Subjective   Patient ID: Danielle Mason is a 64 y.o. female who presents for prolapse and Pre-op Visit.  Known posterior vaginal wall prolapse.  Review of my previous note       Parag Timmons MD  Physician  Obstetrics     Progress Notes     Signed     Encounter Date: 1/8/2025    Signed     Expand All Collapse All       Subjective  Patient ID: Danielle Mason is a 64 y.o. female who presents for Follow-up.  Following up on posterior wall prolapse that is somewhat symptomatic causing some discomfort and soreness.  As she has been doing pelvic floor physical therapy.  It has helped on many levels.  Feels her constipation is better.  She is concerned that her daughter is having a baby in May and she would like to be able to help.  Overall very happy with the pelvic floor physical therapy.  I did suggest that we wait another 2 months to see if her symptoms improve.  If not we can consider surgical correction.  Follow-up in May.  Prior vaginal hysterectomy and anterior colporrhaphy.  Known posterior wall prolapse.  Discussed that constipation may not be related to her prolapse.     Review of my last note     Parag Timmons MD  Physician  Obstetrics     Progress Notes     Signed     Encounter Date: 11/8/2024     Signed      Expand All Collapse All        Subjective  Patient ID: Danielle Mason is a 64 y.o. female who presents for Consult.  Established patient 64 years old.  3 children born vaginally.  Largest 9 pounds.  March 2022 she had a vaginal hysterectomy with anterior colporrhaphy NuPrep presents with 6 weeks of pelvic pressure.  She does have constipation.  Can feel a bulge.  She states that she also has urinary frequency.  NuPrep on exam there is a grade 2-3 posterior vaginal wall prolapse consistent with rectocele.     Review diagrams and etiology.  Review options.  Discussed that pelvic organ prolapse is common after multiple children especially 1 weighing 9 pounds.  On exam today there is excellent support  of the vault of the vagina and bladder.  This is mainly a posterior vaginal wall prolapse.  Review options including observation, pessary, pelvic floor physical therapy, surgical correction     This point would recommend pelvic floor physical therapy.  Follow-up in 8 weeks     Female  Problem  The patient's primary symptoms include genital itching and pelvic pain. This is a new problem. The current episode started more than 1 month ago. The problem occurs constantly. The problem has been unchanged. The pain is mild. The problem affects both sides. She is not pregnant. Associated symptoms include constipation, frequency and urgency. The symptoms are aggravated by activity and bowel movements. She is sexually active. No, her partner does not have an STD. She uses tubal ligation for contraception. She is postmenopausal.         Review of Systems   Gastrointestinal:  Positive for constipation.   Genitourinary:  Positive for frequency, pelvic pain and urgency.   All other systems reviewed and are negative.           Objective  Physical Exam  Genitourinary:     General: Normal vulva.      Vagina: Prolapsed vaginal walls present.      Comments: Grade 2-3 rectocele.  Prior vaginal hysterectomy with anterior colporrhaphy.  Excellent apical and anterior wall support              Assessment/Plan  Trial of pelvic floor physical therapy.  Follow-up 8 weeks        Parag Timmons MD 11/08/24 9:04 AM               Impression is pelvic organ prolapse.  Follow-up in March for exam and consideration of surgical correction.  Depending on complexity of the findings will determine whether I do the repair or we refer to urogynecology.  Continue pelvic floor physical therapy           Review of Systems   Gastrointestinal:  Positive for constipation.            Objective  Physical Exam  Constitutional:       Appearance: Normal appearance.   Neurological:      Mental Status: She is alert.               Assessment/Plan  Continue pelvic floor  physical therapy for posterior wall prolapse.  Follow-up in March.  Will perform exam and determine if surgical correction is appropriate        Parag Timmons MD 01/08/25 11:16 AM       She has completed her physical therapy.  She states she did not do it at home.  She is at a point where she is ready for surgical repair.  Vaginal exam confirms mainly posterior vaginal wall prolapse but there is a component of enterocele as well as rectocele.  She is sexually active.  Because of the complexity of the posterior wall prolapse we will recommend she see urogynecology.  Name and number given.            Review of Systems   Gastrointestinal:  Positive for constipation.       Objective   Physical Exam  Constitutional:       Appearance: Normal appearance.   Genitourinary:     General: Normal vulva.      Vagina: Prolapsed vaginal walls present.      Comments: Prior hysterectomy.  Mainly posterior wall prolapse consistent of enterocele and rectocele.  Neurological:      Mental Status: She is alert.         Assessment/Plan   Symptomatic posterior wall prolapse.  Per urogynecology         Parag Timmons MD 03/12/25 10:01 AM

## 2025-03-27 ENCOUNTER — APPOINTMENT (OUTPATIENT)
Dept: UROLOGY | Facility: CLINIC | Age: 65
End: 2025-03-27
Payer: MEDICARE

## 2025-03-27 DIAGNOSIS — N32.81 OAB (OVERACTIVE BLADDER): ICD-10-CM

## 2025-03-27 DIAGNOSIS — N81.9 FEMALE GENITAL PROLAPSE, UNSPECIFIED TYPE: ICD-10-CM

## 2025-03-27 DIAGNOSIS — N81.6 RECTOCELE: ICD-10-CM

## 2025-03-27 DIAGNOSIS — N39.3 SUI (STRESS URINARY INCONTINENCE, FEMALE): Primary | ICD-10-CM

## 2025-03-27 LAB
POC APPEARANCE, URINE: CLEAR
POC BILIRUBIN, URINE: NEGATIVE
POC BLOOD, URINE: ABNORMAL
POC COLOR, URINE: ABNORMAL
POC GLUCOSE, URINE: NEGATIVE MG/DL
POC KETONES, URINE: NEGATIVE MG/DL
POC LEUKOCYTES, URINE: NEGATIVE
POC NITRITE,URINE: NEGATIVE
POC PH, URINE: 5.5 PH
POC PROTEIN, URINE: NEGATIVE MG/DL
POC SPECIFIC GRAVITY, URINE: 1.01
POC UROBILINOGEN, URINE: 0.2 EU/DL

## 2025-03-27 PROCEDURE — 99204 OFFICE O/P NEW MOD 45 MIN: CPT | Performed by: STUDENT IN AN ORGANIZED HEALTH CARE EDUCATION/TRAINING PROGRAM

## 2025-03-27 NOTE — PROGRESS NOTES
HISTORY OF PRESENT ILLNESS:  Danielle Mason is a 64 y.o. female who presents today as a new patient.  Referred by Dr. Timmons for vaginal prolapse and urinary incontinence.  Patient complains of a noticeable vaginal bulge as well as difficulty with  bowel movements.  She is also very uncomfortable.  She reports urinary urgency and frequency and   Stress incontinence.  She is bothered by both, the stress is more bothersome to her.  Bowel movements are normal.   tried pelvic floor PT without any improvement.  She has a history of known vaginal hysterectomy and anterior repair.         Past Medical History  She has a past medical history of Hyperlipidemia, Lung granuloma (Multi), and Tinnitus.    Surgical History  She has a past surgical history that includes Other surgical history (11/10/2021); CT guided percutaneous biopsy lung (01/10/2022); and Hysterectomy.     Social History  She reports that she has never smoked. She has never used smokeless tobacco. She reports current alcohol use of about 2.0 standard drinks of alcohol per week. She reports that she does not use drugs.    Family History  Family History   Problem Relation Name Age of Onset    Arthritis Mother Kellie     Cancer Mother Kellie     Diabetes Mother Kellie     Hypertension Mother Kellie     Kidney disease Mother Kellie     Vision loss Mother Kellie     Thyroid cancer Mother Kellie     Atrial fibrillation Father Nixon     Hearing loss Father Nixon     Kidney disease Father Nixon     Stroke Father Nixon     Kidney cancer Daughter Mandy     Breast cancer Maternal Grandmother Chela 75        Allergies  Patient has no known allergies.      A comprehensive 10+ review of systems was negative except for: see hpi                          PHYSICAL EXAMINATION:  BP Readings from Last 3 Encounters:   03/12/25 126/76   01/08/25 140/84   11/08/24 112/74      Wt Readings from Last 3 Encounters:   03/12/25 88.9 kg (196 lb)   01/08/25 89.8 kg (198 lb)   11/08/24  "87.1 kg (192 lb)      BMI: Estimated body mass index is 29.8 kg/m² as calculated from the following:    Height as of 3/12/25: 1.727 m (5' 8\").    Weight as of 3/12/25: 88.9 kg (196 lb).  BSA: Estimated body surface area is 2.07 meters squared as calculated from the following:    Height as of 3/12/25: 1.727 m (5' 8\").    Weight as of 3/12/25: 88.9 kg (196 lb).  HEENT: Normocephalic, atraumatic, PER EOMI, nonicteric, trachea normal, thyroid normal, oropharynx normal.  CARDIAC: regular rate & rhythm, S1 & S2 normal.  No heaves, thrills, gallops or murmurs.  LUNGS: Clear to auscultation, no spinal or CV tenderness.  EXTREMITIES: No evidence of cyanosis, clubbing or edema.        Pelvic:  Chaperone for pelvic exam:   Genitourinary:  normal external genitalia, [place]tholin's glands, [place]'s glands negative,   Urethra normal meatus, non-tender, no periurethral mass  Vaginal mucosa  normal  Adnexae  negative nontender, no masses  Atrophy positive    CST negative  Pelvic floor muscle contraction  4/5    POP-Q (in supine position):       Aa -2     Ba -2.     C -4              gh 3     pb 3     tvl 8              Ap 1     Bp 1     D     Rectal: no hemorrhoids, fissures or masses.    PVR (by ultrasound):          Assessment:  64 y.o. female presents as a new patient with posterior and apical prolapse and mixed urinary incontinence stress time    Posterior compartment prolapse  -We discussed that posterior colporrhaphy is extremely effective in correcting this problem with over 95% success rate in helping with stool trapping and the vaginal bulge   The major complications of the surgery are de fior dyspareunia, bleeding and infection    -Will plan on sacrospinous and posterior repair     -may be a candidate for the Believe trial            SELAM    -discussed mechanism of UUI and KAELA, and treatment options for both including PFT, pessary, sling for KAELA and PFT, pharmacotherapy and third-line therapy for OAB    -May be a candidate for " the Believe trial    Follow up after UDS       All questions and concerns were answered and addressed.  The patient expressed understanding and agrees with the plan.     Paulino Barron MD    Scribe Attestation  By signing my name below, I, Anya Ayoub Scribdesmond   attest that this documentation has been prepared under the direction and in the presence of Paulino Barron MD.

## 2025-03-27 NOTE — LETTER
March 28, 2025     Parag Timmons MD  [address]   [place] [facility] [[email or web address] [address]  [place] [address]    Patient: Danielle Mason   YOB: 1960   Date of Visit: 3/27/2025       Dear Dr. Parag Timmons MD:    Thank you for referring Danielle Mason to me for evaluation. Below are my notes for this consultation.  If you have questions, please do not hesitate to call me. I look forward to following your patient along with you.       Sincerely,     Paulino Barron MD      CC: Amy Wsetbrook, DO  ______________________________________________________________________________________    HISTORY OF PRESENT ILLNESS:  Danielle Mason is a 64 y.o. female who presents today as a new patient.  Referred by Dr. Timmons for vaginal prolapse and urinary incontinence.  Patient complains of a noticeable vaginal bulge as well as difficulty with  bowel movements.  She is also very uncomfortable.  She reports urinary urgency and frequency and   Stress incontinence.  She is bothered by both, the stress is more bothersome to her.  Bowel movements are normal.   tried pelvic floor PT without any improvement.  She has a history of known vaginal hysterectomy and anterior repair.         Past Medical History  She has a past medical history of Hyperlipidemia, Lung granuloma (Multi), and Tinnitus.    Surgical History  She has a past surgical history that includes Other surgical history (11/10/2021); CT guided percutaneous biopsy lung (01/10/2022); and Hysterectomy.     Social History  She reports that she has never smoked. She has never used smokeless tobacco. She reports current alcohol use of about 2.0 standard drinks of alcohol per week. She reports that she does not use drugs.    Family History  Family History   Problem Relation Name Age of Onset   • Arthritis Mother Kellie    • Cancer Mother Kellie    • Diabetes Mother Kellie    • Hypertension Mother Kellie    • Kidney disease Mother Kellie    • Vision loss Mother  "Kellie    • Thyroid cancer Mother Kellie    • Atrial fibrillation Father Nixon    • Hearing loss Father Nixon    • Kidney disease Father Nixon    • Stroke Father Nixon    • Kidney cancer Daughter Mandy    • Breast cancer Maternal Grandmother Chela 75        Allergies  Patient has no known allergies.      A comprehensive 10+ review of systems was negative except for: see hpi                          PHYSICAL EXAMINATION:  BP Readings from Last 3 Encounters:   03/12/25 126/76   01/08/25 140/84   11/08/24 112/74      Wt Readings from Last 3 Encounters:   03/12/25 88.9 kg (196 lb)   01/08/25 89.8 kg (198 lb)   11/08/24 87.1 kg (192 lb)      BMI: Estimated body mass index is 29.8 kg/m² as calculated from the following:    Height as of 3/12/25: 1.727 m (5' 8\").    Weight as of 3/12/25: 88.9 kg (196 lb).  BSA: Estimated body surface area is 2.07 meters squared as calculated from the following:    Height as of 3/12/25: 1.727 m (5' 8\").    Weight as of 3/12/25: 88.9 kg (196 lb).  HEENT: Normocephalic, atraumatic, PER EOMI, nonicteric, trachea normal, thyroid normal, oropharynx normal.  CARDIAC: regular rate & rhythm, S1 & S2 normal.  No heaves, thrills, gallops or murmurs.  LUNGS: Clear to auscultation, no spinal or CV tenderness.  EXTREMITIES: No evidence of cyanosis, clubbing or edema.        Pelvic:  Chaperone for pelvic exam:   Genitourinary:  normal external genitalia, Bartholin's glands, [place]'s glands negative,   Urethra normal meatus, non-tender, no periurethral mass  Vaginal mucosa  normal  Adnexae  negative nontender, no masses  Atrophy positive    CST negative  Pelvic floor muscle contraction  4/5    POP-Q (in supine position):       Aa -2     Ba -2.     C -4              gh 3     pb 3     tvl 8              Ap 1     Bp 1     D     Rectal: no hemorrhoids, fissures or masses.    PVR (by ultrasound):          Assessment:  64 y.o. female presents as a new patient with posterior and apical prolapse and mixed " urinary incontinence stress time    Posterior compartment prolapse  -We discussed that posterior colporrhaphy is extremely effective in correcting this problem with over 95% success rate in helping with stool trapping and the vaginal bulge   The major complications of the surgery are de fior dyspareunia, bleeding and infection    -Will plan on sacrospinous and posterior repair     -may be a candidate for the Believe trial            SELAM    -discussed mechanism of UUI and KAELA, and treatment options for both including PFT, pessary, sling for KAELA and PFT, pharmacotherapy and third-line therapy for OAB    -May be a candidate for the Believe trial    Follow up after UDS       All questions and concerns were answered and addressed.  The patient expressed understanding and agrees with the plan.     Paulino Barron MD    Scribe Attestation  By signing my name below, I, Brock Perez   attest that this documentation has been prepared under the direction and in the presence of Paulino Barron MD.

## 2025-03-28 ENCOUNTER — HOSPITAL ENCOUNTER (OUTPATIENT)
Facility: HOSPITAL | Age: 65
Setting detail: OUTPATIENT SURGERY
End: 2025-03-28
Attending: STUDENT IN AN ORGANIZED HEALTH CARE EDUCATION/TRAINING PROGRAM | Admitting: STUDENT IN AN ORGANIZED HEALTH CARE EDUCATION/TRAINING PROGRAM
Payer: MEDICARE

## 2025-03-28 DIAGNOSIS — G89.18 POSTOPERATIVE PAIN: ICD-10-CM

## 2025-03-28 DIAGNOSIS — N81.6 RECTOCELE: ICD-10-CM

## 2025-03-28 DIAGNOSIS — N81.9 FEMALE GENITAL PROLAPSE, UNSPECIFIED TYPE: ICD-10-CM

## 2025-03-28 DIAGNOSIS — N39.3 SUI (STRESS URINARY INCONTINENCE, FEMALE): Primary | ICD-10-CM

## 2025-03-28 RX ORDER — PHENAZOPYRIDINE HYDROCHLORIDE 200 MG/1
200 TABLET, FILM COATED ORAL ONCE
OUTPATIENT
Start: 2025-03-28 | End: 2025-03-28

## 2025-03-28 RX ORDER — ACETAMINOPHEN 325 MG/1
975 TABLET ORAL ONCE
OUTPATIENT
Start: 2025-03-28 | End: 2025-03-28

## 2025-03-28 RX ORDER — CELECOXIB 50 MG/1
200 CAPSULE ORAL ONCE
OUTPATIENT
Start: 2025-03-28 | End: 2025-03-28

## 2025-03-28 RX ORDER — CEFAZOLIN SODIUM 2 G/100ML
2 INJECTION, SOLUTION INTRAVENOUS ONCE
OUTPATIENT
Start: 2025-03-28 | End: 2025-03-28

## 2025-04-14 ENCOUNTER — APPOINTMENT (OUTPATIENT)
Dept: UROLOGY | Facility: CLINIC | Age: 65
End: 2025-04-14
Payer: MEDICARE

## 2025-04-14 DIAGNOSIS — N39.3 STRESS INCONTINENCE OF URINE: ICD-10-CM

## 2025-04-14 PROCEDURE — 51784 ANAL/URINARY MUSCLE STUDY: CPT | Performed by: STUDENT IN AN ORGANIZED HEALTH CARE EDUCATION/TRAINING PROGRAM

## 2025-04-14 PROCEDURE — 51797 INTRAABDOMINAL PRESSURE TEST: CPT | Performed by: STUDENT IN AN ORGANIZED HEALTH CARE EDUCATION/TRAINING PROGRAM

## 2025-04-14 PROCEDURE — 51729 CYSTOMETROGRAM W/VP&UP: CPT | Performed by: STUDENT IN AN ORGANIZED HEALTH CARE EDUCATION/TRAINING PROGRAM

## 2025-04-14 PROCEDURE — 51741 ELECTRO-UROFLOWMETRY FIRST: CPT | Performed by: STUDENT IN AN ORGANIZED HEALTH CARE EDUCATION/TRAINING PROGRAM

## 2025-04-14 NOTE — PROGRESS NOTES
Daniellesheela Mason 63 y/o female    Patient was referred by Dr. Barron to evaluate stress urinary incontinence.      Dr. Villar was present in the office at time of study.      Pre uroflow was completed today with a PVR of 400 ml.      Patient did leak on CLPP/VLPP.  Patient did not have DO with leak.  PVR after study was 0ml.      Patient instructed to increase fluids if blood in the urine or burning due to cath insertion.  Patient understood and consented to Urodynamics.  Patient will follow up with Dr. Barron to review results.  04/14/25/ CM

## 2025-04-23 ENCOUNTER — PATIENT MESSAGE (OUTPATIENT)
Dept: UROLOGY | Facility: HOSPITAL | Age: 65
End: 2025-04-23
Payer: MEDICARE

## 2025-04-27 NOTE — PROGRESS NOTES
"HISTORY OF PRESENT ILLNESS:  Danielle Mason is a 64 y.o. female who presents today for a follow up visit. She did have the UDS done.  UDS demonstrates stress urinary incontinence.  Patient is interested in the BELIEVE trial.         Past Medical History  She has a past medical history of Hyperlipidemia, Lung granuloma (Multi), and Tinnitus.    Surgical History  She has a past surgical history that includes Other surgical history (11/10/2021); CT guided percutaneous biopsy lung (01/10/2022); and Hysterectomy.     Social History  She reports that she has never smoked. She has never used smokeless tobacco. She reports current alcohol use of about 2.0 standard drinks of alcohol per week. She reports that she does not use drugs.    Family History  Family History[1]     Allergies  Patient has no known allergies.      A comprehensive 10+ review of systems was negative except for: see hpi                          PHYSICAL EXAMINATION:  BP Readings from Last 3 Encounters:   03/12/25 126/76   01/08/25 140/84   11/08/24 112/74      Wt Readings from Last 3 Encounters:   03/12/25 88.9 kg (196 lb)   01/08/25 89.8 kg (198 lb)   11/08/24 87.1 kg (192 lb)      BMI: Estimated body mass index is 29.8 kg/m² as calculated from the following:    Height as of 3/12/25: 1.727 m (5' 8\").    Weight as of 3/12/25: 88.9 kg (196 lb).  BSA: Estimated body surface area is 2.07 meters squared as calculated from the following:    Height as of 3/12/25: 1.727 m (5' 8\").    Weight as of 3/12/25: 88.9 kg (196 lb).  HEENT: Normocephalic, atraumatic, PER EOMI, nonicteric, trachea normal, thyroid normal, oropharynx normal.  CARDIAC: regular rate & rhythm, S1 & S2 normal.  No heaves, thrills, gallops or murmurs.  LUNGS: Clear to auscultation, no spinal or CV tenderness.  EXTREMITIES: No evidence of cyanosis, clubbing or edema.               Assessment:  64 y.o. female presents as a new patient with posterior and apical prolapse and mixed urinary " incontinence stress time    Posterior compartment prolapse  Plan on sacrospinous fixation and AP repair      SELAM:    -UDS shows KAELA, she would like to participate in the BELIEVE trial  -Discussed options and study with her, she will think about this   -Rx flomax to take 3 days pre op and 7 days post op       Follow up post op       All questions and concerns were answered and addressed.  The patient expressed understanding and agrees with the plan.     Paulino Barron MD    Scribe Attestation  By signing my name below, I, Anya Ayoub, Scribdesmond   attest that this documentation has been prepared under the direction and in the presence of Paulino Barron MD.         [1]   Family History  Problem Relation Name Age of Onset    Arthritis Mother Kellie     Cancer Mother Kellie     Diabetes Mother Kellie     Hypertension Mother Kellie     Kidney disease Mother Kellie     Vision loss Mother Kellie     Thyroid cancer Mother Kellie     Atrial fibrillation Father Nixon     Hearing loss Father Nixon     Kidney disease Father Nixon     Stroke Father Nixon     Kidney cancer Daughter Mandy     Breast cancer Maternal Grandmother Chela 75

## 2025-04-28 ENCOUNTER — APPOINTMENT (OUTPATIENT)
Dept: UROLOGY | Facility: CLINIC | Age: 65
End: 2025-04-28
Payer: MEDICARE

## 2025-04-28 DIAGNOSIS — N32.81 OAB (OVERACTIVE BLADDER): Primary | ICD-10-CM

## 2025-04-28 DIAGNOSIS — N39.3 SUI (STRESS URINARY INCONTINENCE, FEMALE): ICD-10-CM

## 2025-04-28 DIAGNOSIS — N81.6 RECTOCELE: ICD-10-CM

## 2025-04-28 PROCEDURE — 51797 INTRAABDOMINAL PRESSURE TEST: CPT | Performed by: STUDENT IN AN ORGANIZED HEALTH CARE EDUCATION/TRAINING PROGRAM

## 2025-04-28 PROCEDURE — 51741 ELECTRO-UROFLOWMETRY FIRST: CPT | Performed by: STUDENT IN AN ORGANIZED HEALTH CARE EDUCATION/TRAINING PROGRAM

## 2025-04-28 PROCEDURE — 51729 CYSTOMETROGRAM W/VP&UP: CPT | Performed by: STUDENT IN AN ORGANIZED HEALTH CARE EDUCATION/TRAINING PROGRAM

## 2025-04-28 PROCEDURE — 51784 ANAL/URINARY MUSCLE STUDY: CPT | Performed by: STUDENT IN AN ORGANIZED HEALTH CARE EDUCATION/TRAINING PROGRAM

## 2025-04-28 PROCEDURE — 99214 OFFICE O/P EST MOD 30 MIN: CPT | Performed by: STUDENT IN AN ORGANIZED HEALTH CARE EDUCATION/TRAINING PROGRAM

## 2025-04-28 RX ORDER — TAMSULOSIN HYDROCHLORIDE 0.4 MG/1
CAPSULE ORAL
Qty: 10 CAPSULE | Refills: 0 | Status: SHIPPED | OUTPATIENT
Start: 2025-04-28

## 2025-05-02 ENCOUNTER — PRE-ADMISSION TESTING (OUTPATIENT)
Dept: PREADMISSION TESTING | Facility: HOSPITAL | Age: 65
End: 2025-05-02
Payer: MEDICARE

## 2025-05-02 VITALS
RESPIRATION RATE: 16 BRPM | HEIGHT: 68 IN | BODY MASS INDEX: 29.74 KG/M2 | OXYGEN SATURATION: 99 % | HEART RATE: 72 BPM | WEIGHT: 196.21 LBS | TEMPERATURE: 96.8 F

## 2025-05-02 DIAGNOSIS — N81.9 FEMALE GENITAL PROLAPSE, UNSPECIFIED TYPE: ICD-10-CM

## 2025-05-02 DIAGNOSIS — N81.6 RECTOCELE: ICD-10-CM

## 2025-05-02 DIAGNOSIS — N39.3 SUI (STRESS URINARY INCONTINENCE, FEMALE): ICD-10-CM

## 2025-05-02 DIAGNOSIS — N32.81 OAB (OVERACTIVE BLADDER): ICD-10-CM

## 2025-05-02 DIAGNOSIS — Z01.818 PREOPERATIVE EXAMINATION: Primary | ICD-10-CM

## 2025-05-02 LAB
ALBUMIN SERPL BCP-MCNC: 4.2 G/DL (ref 3.4–5)
ALP SERPL-CCNC: 66 U/L (ref 33–136)
ALT SERPL W P-5'-P-CCNC: 24 U/L (ref 7–45)
ANION GAP SERPL CALC-SCNC: 10 MMOL/L (ref 10–20)
AST SERPL W P-5'-P-CCNC: 21 U/L (ref 9–39)
ATRIAL RATE: 68 BPM
BILIRUB SERPL-MCNC: 0.5 MG/DL (ref 0–1.2)
BUN SERPL-MCNC: 13 MG/DL (ref 6–23)
CALCIUM SERPL-MCNC: 9.2 MG/DL (ref 8.6–10.3)
CHLORIDE SERPL-SCNC: 103 MMOL/L (ref 98–107)
CO2 SERPL-SCNC: 29 MMOL/L (ref 21–32)
CREAT SERPL-MCNC: 0.67 MG/DL (ref 0.5–1.05)
EGFRCR SERPLBLD CKD-EPI 2021: >90 ML/MIN/1.73M*2
GLUCOSE SERPL-MCNC: 118 MG/DL (ref 74–99)
P AXIS: 62 DEGREES
P OFFSET: 169 MS
P ONSET: 109 MS
POTASSIUM SERPL-SCNC: 3.6 MMOL/L (ref 3.5–5.3)
PR INTERVAL: 234 MS
PROT SERPL-MCNC: 6.9 G/DL (ref 6.4–8.2)
Q ONSET: 226 MS
QRS COUNT: 11 BEATS
QRS DURATION: 90 MS
QT INTERVAL: 414 MS
QTC CALCULATION(BAZETT): 440 MS
QTC FREDERICIA: 431 MS
R AXIS: -14 DEGREES
SODIUM SERPL-SCNC: 138 MMOL/L (ref 136–145)
T AXIS: 20 DEGREES
T OFFSET: 433 MS
VENTRICULAR RATE: 68 BPM

## 2025-05-02 PROCEDURE — 93005 ELECTROCARDIOGRAM TRACING: CPT

## 2025-05-02 PROCEDURE — 84075 ASSAY ALKALINE PHOSPHATASE: CPT

## 2025-05-02 PROCEDURE — 36415 COLL VENOUS BLD VENIPUNCTURE: CPT

## 2025-05-02 PROCEDURE — 99204 OFFICE O/P NEW MOD 45 MIN: CPT | Performed by: NURSE PRACTITIONER

## 2025-05-02 RX ORDER — CHLORHEXIDINE GLUCONATE ORAL RINSE 1.2 MG/ML
15 SOLUTION DENTAL DAILY
Qty: 30 ML | Refills: 0 | Status: SHIPPED | OUTPATIENT
Start: 2025-05-02 | End: 2025-05-04

## 2025-05-02 RX ORDER — CHLORHEXIDINE GLUCONATE 40 MG/ML
1 SOLUTION TOPICAL DAILY
Start: 2025-05-02 | End: 2025-05-07

## 2025-05-02 ASSESSMENT — ENCOUNTER SYMPTOMS
GASTROINTESTINAL NEGATIVE: 1
CONSTITUTIONAL NEGATIVE: 1
RESPIRATORY NEGATIVE: 1
CARDIOVASCULAR NEGATIVE: 1
MUSCULOSKELETAL NEGATIVE: 1
NEUROLOGICAL NEGATIVE: 1

## 2025-05-02 ASSESSMENT — PAIN SCALES - GENERAL: PAINLEVEL_OUTOF10: 0 - NO PAIN

## 2025-05-02 ASSESSMENT — DUKE ACTIVITY SCORE INDEX (DASI)
DASI METS SCORE: 9.9
CAN YOU PARTICIPATE IN STRENOUS SPORTS LIKE SWIMMING, SINGLES TENNIS, FOOTBALL, BASKETBALL, OR SKIING: YES
CAN YOU WALK INDOORS, SUCH AS AROUND YOUR HOUSE: YES
CAN YOU DO MODERATE WORK AROUND THE HOUSE LIKE VACUUMING, SWEEPING FLOORS OR CARRYING GROCERIES: YES
CAN YOU PARTICIPATE IN MODERATE RECREATIONAL ACTIVITIES LIKE GOLF, BOWLING, DANCING, DOUBLES TENNIS OR THROWING A BASEBALL OR FOOTBALL: YES
CAN YOU DO HEAVY WORK AROUND THE HOUSE LIKE SCRUBBING FLOORS OR LIFTING AND MOVING HEAVY FURNITURE: YES
CAN YOU RUN A SHORT DISTANCE: YES
TOTAL_SCORE: 58.2
CAN YOU WALK A BLOCK OR TWO ON LEVEL GROUND: YES
CAN YOU TAKE CARE OF YOURSELF (EAT, DRESS, BATHE, OR USE TOILET): YES
CAN YOU DO YARD WORK LIKE RAKING LEAVES, WEEDING OR PUSHING A MOWER: YES
CAN YOU HAVE SEXUAL RELATIONS: YES
CAN YOU DO LIGHT WORK AROUND THE HOUSE LIKE DUSTING OR WASHING DISHES: YES
CAN YOU CLIMB A FLIGHT OF STAIRS OR WALK UP A HILL: YES

## 2025-05-02 ASSESSMENT — PAIN - FUNCTIONAL ASSESSMENT: PAIN_FUNCTIONAL_ASSESSMENT: 0-10

## 2025-05-02 ASSESSMENT — LIFESTYLE VARIABLES: SMOKING_STATUS: NONSMOKER

## 2025-05-02 NOTE — CPM/PAT H&P
CPM/PAT Evaluation       Name: Danielel Mason (Danielle Mason)  /Age: 1960/64 y.o.     Visit Type:   In-Person       Chief Complaint: KAELA; rectocele    HPI 63 y/o female scheduled for desire a sling; AP repair on 2025 with  Dr. Barron secondary to KAELA; rectocele.  PMHX includes KAELA, Lung nodule .  PAT is consulted today for perioperative risk stratification and optimization.      Medical History[1]    Surgical History[2]    Patient  reports being sexually active and has had partner(s) who are male. She reports using the following method of birth control/protection: Female Sterilization.    Family History[3]    Allergies[4]    Prior to Admission medications    Medication Sig Start Date End Date Taking? Authorizing Provider   calcitriol (Rocaltrol) 0.25 mcg capsule Take 1 capsule (0.25 mcg) by mouth once daily.    Historical Provider, MD   calcium citrate (Calcitrate) 200 mg (950 mg) tablet Take 1 tablet (200 mg) by mouth once daily.    Historical Provider, MD   hydroCHLOROthiazide (HYDRODiuril) 12.5 mg tablet Take 1 tablet (12.5 mg) by mouth once daily.    Historical Provider, MD   rosuvastatin (Crestor) 10 mg tablet Take 1 tablet (10 mg) by mouth once daily.    Historical Provider, MD   tamsulosin (Flomax) 0.4 mg 24 hr capsule Take 3 days before surgery and 7 days after 25   MD HU Love ROS:   Constitutional:   neg    Neuro/Psych:   neg    Eyes:    use of corrective lenses  Ears:    tinnitus  Nose:   Mouth:   Throat:   Neck:   Cardio:   neg    Respiratory:   neg    Endocrine:   GI:   neg    :   neg    Musculoskeletal:   neg    Hematologic:   neg    Skin:      Physical Exam  Vitals reviewed.   Constitutional:       Appearance: Normal appearance.   HENT:      Head: Normocephalic and atraumatic.      Mouth/Throat:      Mouth: Mucous membranes are moist.      Pharynx: Oropharynx is clear.   Eyes:      Extraocular Movements: Extraocular movements intact.      Pupils: Pupils are  "equal, round, and reactive to light.   Cardiovascular:      Rate and Rhythm: Normal rate and regular rhythm.   Pulmonary:      Effort: Pulmonary effort is normal.      Breath sounds: Normal breath sounds.   Abdominal:      General: Abdomen is flat.      Palpations: Abdomen is soft.   Musculoskeletal:         General: Normal range of motion.      Cervical back: Normal range of motion and neck supple.   Skin:     General: Skin is warm and dry.   Neurological:      General: No focal deficit present.      Mental Status: She is alert and oriented to person, place, and time.   Psychiatric:         Mood and Affect: Mood normal.         Behavior: Behavior normal.          Airway    Testing/Diagnostic:     Patient Specialist/PCP:     Visit Vitals  Pulse 72   Temp 36 °C (96.8 °F) (Tympanic)   Resp 16   Ht 1.727 m (5' 8\")   Wt 89 kg (196 lb 3.4 oz)   SpO2 99%   BMI 29.83 kg/m²   OB Status Hysterectomy   Smoking Status Never   BSA 2.07 m²       DASI Risk Score      Flowsheet Row Pre-Admission Testing from 5/2/2025 in [facility] Questionnaire Series Submission from 4/2/2025 in [facility] with Generic Provider Ana   Can you take care of yourself (eat, dress, bathe, or use toilet)?  2.75 filed at 05/02/2025 0901 2.75  filed at 04/02/2025 1646   Can you walk indoors, such as around your house? 1.75 filed at 05/02/2025 0901 1.75  filed at 04/02/2025 1646   Can you walk a block or two on level ground?  2.75 filed at 05/02/2025 0901 2.75  filed at 04/02/2025 1646   Can you climb a flight of stairs or walk up a hill? 5.5 filed at 05/02/2025 0901 5.5  filed at 04/02/2025 1646   Can you run a short distance? 8 filed at 05/02/2025 0901 8  filed at 04/02/2025 1646   Can you do light work around the house like dusting or washing dishes? 2.7 filed at 05/02/2025 0901 2.7  filed at 04/02/2025 1646   Can you do moderate work around the house like vacuuming, sweeping floors or carrying groceries? 3.5 filed at 05/02/2025 0901 " 3.5  filed at 04/02/2025 1646   Can you do heavy work around the house like scrubbing floors or lifting and moving heavy furniture?  8 filed at 05/02/2025 0901 8  filed at 04/02/2025 1646   Can you do yard work like raking leaves, weeding or pushing a mower? 4.5 filed at 05/02/2025 0901 4.5  filed at 04/02/2025 1646   Can you have sexual relations? 5.25 filed at 05/02/2025 0901 5.25  filed at 04/02/2025 1646   Can you participate in moderate recreational activities like golf, bowling, dancing, doubles tennis or throwing a baseball or football? 6 filed at 05/02/2025 0901 6  filed at 04/02/2025 1646   Can you participate in strenous sports like swimming, singles tennis, football, basketball, or skiing? 7.5 filed at 05/02/2025 0901 7.5  filed at 04/02/2025 1646   DASI SCORE 58.2 filed at 05/02/2025 0901 58.2  filed at 04/02/2025 1646   METS Score (Will be calculated only when all the questions are answered) 9.9 filed at 05/02/2025 0901 9.9  filed at 04/02/2025 1646          Caprini DVT Assessment      Flowsheet Row Pre-Admission Testing from 5/2/2025 in [facility]   DVT Score (IF A SCORE IS NOT CALCULATING, MUST SELECT A BMI TO COMPLETE) 5 filed at 05/02/2025 0921   Surgical Factors Major surgery planned, including arthroscopic and laproscopic (1-2 hours) filed at 05/02/2025 0921   BMI (BMI MUST BE CHOSEN) 30 or less filed at 05/02/2025 0921          Modified Frailty Index    No data to display       [id number] Stroke Risk Points  Current as of just now        N/A 0 to 9 Points:      Last Change: N/A          The [id number] risk score (Lip JOSE, et al. 2009. © 2010 [employer]) quantifies the risk of stroke for a patient with atrial fibrillation. For patients without atrial fibrillation or under the age of 18 this score appears as N/A. Higher score values generally indicate higher risk of stroke.        This score is not applicable to this patient. Components are not calculated.           Revised Cardiac Risk Index      Flowsheet Row Pre-Admission Testing from 5/2/2025 in [facility]   High-Risk Surgery (Intraperitoneal, Intrathoracic,Suprainguinal vascular) 0 filed at 05/02/2025 0928   History of ischemic heart disease (History of MI, History of positive exercuse test, Current chest paint considered due to myocardial ischemia, Use of nitrate therapy, ECG with pathological Q Waves) 0 filed at 05/02/2025 0928   History of congestive heart failure (pulmonary edemia, bilateral rales or S3 gallop, Paroxysmal nocturnal dyspnea, CXR showing pulmonary vascular redistribution) 0 filed at 05/02/2025 0928   History of cerebrovascular disease (Prior TIA or stroke) 0 filed at 05/02/2025 0928   Pre-operative insulin treatment 0 filed at 05/02/2025 0928   Pre-operative creatinine>2 mg/dl 0 filed at 05/02/2025 0928   Revised Cardiac Risk Calculator 0 filed at 05/02/2025 0928          Apfel Simplified Score      Flowsheet Row Pre-Admission Testing from 5/2/2025 in [facility]   Smoking status 1 filed at 05/02/2025 0929   History of motion sickness or PONV  0 filed at 05/02/2025 0929   Use of postoperative opioids 1 filed at 05/02/2025 0929   Gender - Female 1=Yes filed at 05/02/2025 0929   Apfel Simplified Score Calculator 3 filed at 05/02/2025 0929          Risk Analysis Index Results This Encounter    No data found in the last 10 encounters.       Stop Bang Score      Flowsheet Row Pre-Admission Testing from 5/2/2025 in [facility] Questionnaire Series Submission from 4/2/2025 in [facility] with Generic Provider Ana   Do you snore loudly? 0 filed at 05/02/2025 0901 1  filed at 04/02/2025 1646   Do you often feel tired or fatigued after your sleep? 0 filed at 05/02/2025 0901 0  filed at 04/02/2025 1646   Has anyone ever observed you stop breathing in your sleep? 0 filed at 05/02/2025 0901 0  filed at 04/02/2025 1646   Do you have or are you being treated for  high blood pressure? 1 filed at 05/02/2025 0901 1  filed at 04/02/2025 1646   Recent BMI (Calculated) 29.8 filed at 05/02/2025 0901 29.8  filed at 04/02/2025 1646   Is BMI greater than 35 kg/m2? 0=No filed at 05/02/2025 0901 0=No  filed at 04/02/2025 1646   Age older than 50 years old? 1=Yes filed at 05/02/2025 0901 1=Yes  filed at 04/02/2025 1646   Is your neck circumference greater than 17 inches (Male) or 16 inches (Female)? 0 filed at 05/02/2025 0901 --   Gender - Male 0=No filed at 05/02/2025 0901 0=No  filed at 04/02/2025 1646   STOP-BANG Total Score 2 filed at 05/02/2025 0901 --          Prodigy: High Risk  Total Score: 8              Prodigy Age Score           ARISCAT Score for Postoperative Pulmonary Complications      Flowsheet Row Pre-Admission Testing from 5/2/2025 in [facility]   Age Calculated Score 3 filed at 05/02/2025 0929   Preoperative SpO2 0 filed at 05/02/2025 0929   Respiratory infection in the last month Either upper or lower (i.e., URI, bronchitis, pneumonia), with fever and antibiotic treatment 0 filed at 05/02/2025 0929   Surgical incision  0 filed at 05/02/2025 0929   Duration of surgery  0 filed at 05/02/2025 0929   Emergency Procedure  0 filed at 05/02/2025 0929          Stokes Perioperative Risk for Myocardial Infarction or Cardiac Arrest (RODO)      Flowsheet Row Pre-Admission Testing from 5/2/2025 in [facility]   Calculated Age Score 1.32 filed at 05/02/2025 0929   Functional Status  0 filed at 05/02/2025 0929   ASA Class  -5.17 filed at 05/02/2025 0929   Creatinine 0 filed at 05/02/2025 0929   Type of Procedure  -0.26 filed at 05/02/2025 0929   RODO Total Score  -9.36 filed at 05/02/2025 0929   RODO % 0.01 filed at 05/02/2025 0929            Assessment and Plan:     HPI 65 y/o female scheduled for desire a sling; AP repair on 5/19/2025 with  Dr. Barron secondary to KAELA; rectocele.  PMHX includes KAELA, Lung nodule .  PAT is consulted today for perioperative  risk stratification and optimization.    Neuro:  No neurologic diagnosis or significant findings on chart review, clinical presentation and evaluation.  No grossly apparent neurologic perioperative risk.    Patient is not at increased risk for perioperative CVA      HEENT:  No HEENT diagnosis or significant findings on chart review or clinical presentation and evaluation. No further preoperative testing/intervention indicated at this time.    Cardiovascular:  HTN - Managed by PCP   Continue HcTz   METS: 9.9  RCRI: 0 points, 3.9%  risk for postoperative MACE       Pulmonary:  Follows with thoracic surgery.  Last seen 1/21/2025 for lung nodule  Incidental finding of left lower lobe lung nodule on CT scan  Stable  Stop Bang score is 2 placing patient at low risk for NORMA  PRODIGY: Low risk for opioid induced respiratory depression      Renal:   No renal diagnosis, however patient is at increase risk for perioperative renal complications secondary to  Age equal to or greater than 56      Endocrine:  No endocrine diagnosis or significant findings on chart review or clinical presentation and evaluation. No further testing or intervention is indicated at this time.    Hematologic:  No hematologic diagnosis, however patient is at an increased risk for DVT  Caprini Score 5, patient at High risk for perioperative DVT.  Patient provided with VTE education/handout.    Gastrointestinal:   No GI diagnosis or significant findings on chart review or clinical presentation and evaluation.   Apfel 3    Urology  Seen by Dr. Barron on 4/28/2025 for OAB; KAELA  Pt understands to take Flomax as prescribed   Plan for sacrospinous fixation and AP repair     Infectious disease:   No infectious diagnosis or significant findings on chart review or clinical presentation and evaluation.   Prescription provided for CHG body wash and dental rinse. CHG use instructions reviewed and provided to patient.  Staph screen collected    Musculoskeletal:   No  diagnosis or significant findings on chart review or clinical presentation and evaluation.     Anesthesia/Airway:  No anesthesia complications      Medication instructions and NPO guidelines reviewed with the patient.  All questions or concerns discussed and addressed.      Labs and EKG ordered                  [1]   Past Medical History:  Diagnosis Date    Hyperlipidemia     Hypertension     Lung granuloma (Multi)     Lung nodules     LLL    Tinnitus    [2]   Past Surgical History:  Procedure Laterality Date    COLONOSCOPY      CT GUIDED PERCUTANEOUS BIOPSY LUNG  01/10/2022    CT GUIDED PERCUTANEOUS BIOPSY LUNG 1/10/2022 [facility]    HYSTERECTOMY      OTHER SURGICAL HISTORY  11/10/2021    Tubal ligation    TONSILLECTOMY     [3]   Family History  Problem Relation Name Age of Onset    Arthritis Mother Kellie     Cancer Mother Kellie     Diabetes Mother Kellie     Hypertension Mother Kellie     Kidney disease Mother Kellie     Vision loss Mother Kellie     Thyroid cancer Mother Kellie     Atrial fibrillation Father Nixon     Hearing loss Father Nixon     Kidney disease Father Nixon     Stroke Father Nixon     Kidney cancer Daughter Mandy     Breast cancer Maternal Grandmother Chela 75   [4] No Known Allergies

## 2025-05-02 NOTE — PREPROCEDURE INSTRUCTIONS
Thank you for visiting Preadmission Testing (PAT) today for your pre-procedure evaluation, you were seen by     Catina Galvan CNP  Pre Admission Testing  [facility]  [phone]    This summary includes instructions and information to aid you during your perioperative period.  Please read carefully. If you have any questions about your visit today, please call the number listed above.  If you become ill or have any changes to your health before your surgery, please contact your primary care provider and alert your surgeon.        Preparing for your Surgery       Exercises  Preoperative Deep Breathing Exercises  Why it is important to do deep breathing exercises before my surgery?  Deep breathing exercises strengthen your breathing muscles.  This helps you to recover after your surgery and decreases the chance of breathing complications.  How are the deep breathing exercises done?  Sit straight with your back supported.  Breathe in deeply and slowly through your nose. Your lower rib cage should expand and your abdomen may move forward.  Hold that breath for 3 to 5 seconds.  Breathe out through pursed lips, slowly and completely.  Rest and repeat 10 times every hour while awake.  Rest longer if you become dizzy or lightheaded.       Preoperative Brain Exercises    What are brain exercises?  A brain exercise is any activity that engages your thinking (cognitive) skills.    What types of activities are considered brain exercises?  Jigsaw puzzles, crossword puzzles, word jumble, memory games, word search, and many more.  Many can be found free online or on your phone via a mobile martin.    Why should I do brain exercises before my surgery?  More recent research has shown brain exercise before surgery can lower the risk of postoperative delirium (confusion) which can be especially important for older adults.  Patients who did brain exercises for 5 to 10 hours the days before surgery, cut their  risk of postoperative delirium in half up to 1 week after surgery.    Sit-to-Stand Exercise    What is the sit-to-stand exercise?  The sit-to-stand exercise strengthens the muscles of your lower body and muscles in the center of your body (core muscles for stability) helping to maintain and improve your strength and mobility.  How do I do the sit-to-stand exercise?  The goal is to do this exercise without using your arms or hands.  If this is too difficult, use your arms and hands or a chair with armrests to help slowly push yourself to the standing position and lower yourself back to the sitting position. As the movement becomes easier use your arms and hands less.    Steps to the sit-to-stand exercise  Sit up tall in a sturdy chair, knees bent, feet flat on the floor shoulder-width apart.  Shift your hips/pelvis forward in the chair to correctly position yourself for the next movement.  Lean forward at your hips.  Stand up straight putting equal weight on both feet.  Check to be sure you are properly aligned with the chair, in a slow controlled movement sit back down.  Repeat this exercise 10-15 times.  If needed you can do it fewer times until your strength improves.  Rest for 1 minute.  Do another 10-15 sit-to-stand exercises.  Try to do this in the morning and evening.        Instructions    Preoperative Fasting Guidelines    Why must I stop eating and drinking near surgery time?  With sedation, food or liquid in your stomach can enter your lungs causing serious complications  Food can increase nausea and vomiting  When do I need to stop eating and drinking before my surgery?      Do not eat any food after midnight the night before your surgery/procedure. You may have up to 13.5 ounces of clear liquid until TWO hours before your instructed arrival time to the hospital.  This includes water, black tea/coffee, (no milk or cream) apple juice, and electrolyte drinks (Gatorade). You may chew gum until TWO hours  before your surgery/procedure            Simple things you can do to help prevent blood clots     Blood clots are blockages that can form in the body's veins. When a blood clot forms in your deep veins, it may be called a deep vein thrombosis, or DVT for short. Blood clots can happen in any part of the body where blood flows, but they are most common in the arms and legs. If a piece of a blood clot breaks free and travels to the lungs, it is called a pulmonary embolus (PE). A PE can be a very serious problem.         Being in the hospital or having surgery can raise your chances of getting a blood clot because you may not be well enough to move around as much as you normally do.         Ways you can help prevent blood clots in the hospital       Wearing SCDs  SCDs stands for Sequential Compression Devices.   SCDs are special sleeves that wrap around your legs. They attach to a pump that fills them with air to gently squeeze your legs every few minutes.  This helps return the blood in your legs to your heart.   SCDs should only be taken off when walking or bathing. SCDs may not be comfortable, but they can help save your life.              Pump SCD leg sleeves  Wearing compression stockings - if your doctor orders them. These special snug-fitting stockings gently squeeze your legs to help blood flow.       Walking. Walking helps move the blood in your legs.   If your doctor says it is ok, try walking the halls at least   5 times a day. Ask us to help you get up, so you don't fall.      Taking any blood-thinning medicines your doctor orders.              Ways you can help prevent blood clots at home         Wearing compression stockings - if your doctor orders them.   Walking - to help move the blood in your legs.    Taking any blood-thinning medicines your doctor orders.      Signs of a blood clot or PE    Tell your doctor or nurse right away if you have any of the problems listed below.         If you are at home,  "seek medical care right away. Call 911 for chest pain or problems breathing.            Signs of a blood clot (DVT) - such as pain, swelling, redness, or warmth in your arm or legs.  Signs of a pulmonary embolism (PE) - such as chest pain or feeling short of breath      Tobacco and Alcohol;  Do not drink alcohol or smoke within 24 hours of surgery.  It is best to quit smoking for as long as possible before any surgery or procedure.    Other Instructions  Why did I have my nose, under my arms, and groin swabbed? The purpose of the swab is to identify Staphylococcus aureus inside your nose or on your skin.  The swab was sent to the laboratory for culture.  A positive swab/culture for Staphylococcus aureus is called colonization or carriage.     What is Staphylococcus aureus? Staphylococcus aureus, also known as \"staph\", is a germ found on the skin or in the nose of healthy people.  Sometimes Staphylococcus aureus can get into the body and cause an infection.  This can be minor (such as pimples, boils, or other skin problems).  It might also be serious (such as a blood infection, pneumonia, or a surgical site infection).     What is Staphylococcus aureus colonization or carriage? Colonization or carriage means that a person has the germ but is not sick from it.  These bacteria can be spread on the hands or when breathing or sneezing.   How is Staphylococcus aureus spread? It is most often spread by close contact with a person or item that carries it.   What happens if my culture is positive for Staphylococcus aureus? Your doctor/medical team will use this information to guide any antibiotic treatment which may be necessary.  Regardless of the culture results, we will clean the inside of your nose with a betadine swab just before you have your surgery.   Will I get an infection if I have Staphylococcus aureus in my nose or on my skin? Anyone can get an infection with Staphylococcus aureus.  However, the best way to " reduce your risk of infection is to follow the instructions provided to you for the use of your CHG soap and dental rinse.      Body Wash:     What is a home preoperative antibacterial shower? This shower is a way of cleaning the skin with a germ-killing solution before surgery.  The solution contains chlorhexidine, commonly known as CHG.  CHG is a skin cleanser with germ-killing ability.  Let your doctor know if you are allergic to chlorhexidine.   Why do I need to take a preoperative antibacterial shower? Skin is not sterile.  It is best to try to make your skin as free of germs as possible before surgery.  Proper cleansing with a germ-killing soap before surgery can lower the number of germs on your skin.  This helps to reduce the risk of infection at the surgical site.    Following the instructions listed below will help you prepare your skin for surgery.   How do I use the solution?  If you plan to wash your hair, use your regular shampoo; then rinse your hair and body thoroughly to remove any shampoo residue  Wash your face with your regular soap or water only  Thoroughly rinse your body with water from the neck down  Apply Hibiclens directly on your skin or on a wet washcloth and wash gently; move away from the shower stream when applying Hibiclens to avoid rinsing it off too soon  Rinse thoroughly with warm water and keep out of eyes, ears and mouth; if Hibiclens comes in contact with these areas, rinse out promptly  Dry your skin with a towel  Do not use your regular soap after applying and rinsing with Hibiclens  Do not apply lotions or deodorants to the cleaned body area      Oral/Dental Rinse:     What is oral/dental rinse?  It is a mouthwash. It is a way of cleaning the mouth with a germ-killing solution before your surgery.  The solution contains chlorhexidine, commonly known as CHG. It is used inside the mouth to kill a bacteria known as Staphylococcus aureus.  Let your doctor know if you are allergic  to Chlorhexidine.   Why do I need to use CHG oral/dental rinse? The CHG oral/dental rinse helps to kill a bacteria in your mouth known as Staphylococcus aureus.  This reduces the risk of infection at the surgical site.    Using your CHG oral/dental rinse STEPS: Use your CHG oral/dental rinse after you brush your teeth the night before (at bedtime) and the morning of your surgery.  Follow all directions on your prescription label.    Use the cap on the container to measure 15 ml.  Swish (gargle if you can) the mouthwash in your mouth for at least 30 seconds, (do not swallow) and spit out.  After you use your CHG rinse, do not rinse your mouth with water, drink or eat.    Please refer to the prescription label for the appropriate time to resume oral intake   What side effects might I have using the CHG oral/dental rinse? CHG rinse will stick to plaque on the teeth.  Brush and floss just before use.  Teeth brushing will help avoid staining of plaque during use.          The Week before Surgery        Seven days before Surgery  Check your PAT medication instructions  Do the exercises provided to you by [facility]  Arrange for a responsible, adult licensed  to take you home after surgery and stay with you for 24 hours.  You will not be permitted to drive yourself home if you have received any anesthetic/sedation  Six days before surgery  Check your PAT medication instructions  Do the exercises provided to you by PAT  Start using Chlorhexidene (CHG) body wash if prescribed  Five days before surgery  Check your PAT medication instructions  Do the exercises provided to you by PAT  Continue to use CHG body wash if prescribed  Three days before surgery  Check your PAT medication instructions  Do the exercises provided to you by PAT  Continue to use CHG body wash if prescribed  Two days before surgery  Check your PAT medication instructions  Do the exercises provided to you by [facility]  Continue to use CHG body wash if  prescribed    The Day before Surgery       Check your PAT medication and all other PAT instructions including when to stop eating and drinking  You will be called with your arrival time for surgery in the late afternoon.  If you do not receive a call please reach out to [facility]. [phone]  Do not smoke or drink 24 hours before surgery  Prepare items to bring with you to the hospital  Shower with your chlorhexidine wash if prescribed  Brush your teeth and use your chlorhexidine dental rinse if prescribed    The Day of Surgery       Check your PAT medication instructions  Ensure you follow the instructions for when to stop eating and drinking  Shower, if prescribed use CHG.  Do not apply any lotions, creams, moisturizers, perfume or deodorant  Brush your teeth and use your CHG dental rinse if prescribed  Wear loose comfortable clothing  Avoid make-up  Remove  jewelry and piercings, consider professional piercing removal with a plastic spacer if needed  Bring photo ID and Insurance card  Bring an accurate medication list that includes medication dose, frequency and allergies  Bring a copy of your advanced directives (will, health care power of )  Bring any devices and controllers as well as medical devices you have been provided with for surgery (CPAP, slings, braces, etc.)  Dentures, eyeglasses, and contacts will be removed before surgery, please bring cases for contacts or glasses

## 2025-05-07 PROCEDURE — 87081 CULTURE SCREEN ONLY: CPT | Mod: GEALAB | Performed by: NURSE PRACTITIONER

## 2025-05-09 LAB — STAPHYLOCOCCUS SPEC CULT: ABNORMAL

## 2025-05-15 ENCOUNTER — ANESTHESIA EVENT (OUTPATIENT)
Dept: OPERATING ROOM | Facility: HOSPITAL | Age: 65
End: 2025-05-15
Payer: MEDICARE

## 2025-05-19 ENCOUNTER — PHARMACY VISIT (OUTPATIENT)
Dept: PHARMACY | Facility: CLINIC | Age: 65
End: 2025-05-19
Payer: COMMERCIAL

## 2025-05-19 ENCOUNTER — ANESTHESIA (OUTPATIENT)
Dept: OPERATING ROOM | Facility: HOSPITAL | Age: 65
End: 2025-05-19
Payer: MEDICARE

## 2025-05-19 VITALS
TEMPERATURE: 97.7 F | RESPIRATION RATE: 18 BRPM | HEART RATE: 60 BPM | BODY MASS INDEX: 29.34 KG/M2 | HEIGHT: 68 IN | OXYGEN SATURATION: 98 % | WEIGHT: 193.56 LBS | DIASTOLIC BLOOD PRESSURE: 80 MMHG | SYSTOLIC BLOOD PRESSURE: 121 MMHG

## 2025-05-19 PROCEDURE — 7100000001 HC RECOVERY ROOM TIME - INITIAL BASE CHARGE: Performed by: STUDENT IN AN ORGANIZED HEALTH CARE EDUCATION/TRAINING PROGRAM

## 2025-05-19 PROCEDURE — 2720000007 HC OR 272 NO HCPCS: Performed by: STUDENT IN AN ORGANIZED HEALTH CARE EDUCATION/TRAINING PROGRAM

## 2025-05-19 PROCEDURE — 2500000004 HC RX 250 GENERAL PHARMACY W/ HCPCS (ALT 636 FOR OP/ED): Mod: JZ | Performed by: ANESTHESIOLOGY

## 2025-05-19 PROCEDURE — 2500000001 HC RX 250 WO HCPCS SELF ADMINISTERED DRUGS (ALT 637 FOR MEDICARE OP): Performed by: STUDENT IN AN ORGANIZED HEALTH CARE EDUCATION/TRAINING PROGRAM

## 2025-05-19 PROCEDURE — 7100000009 HC PHASE TWO TIME - INITIAL BASE CHARGE: Performed by: STUDENT IN AN ORGANIZED HEALTH CARE EDUCATION/TRAINING PROGRAM

## 2025-05-19 PROCEDURE — 57260 CMBN ANT PST COLPRHY: CPT | Performed by: STUDENT IN AN ORGANIZED HEALTH CARE EDUCATION/TRAINING PROGRAM

## 2025-05-19 PROCEDURE — 2500000004 HC RX 250 GENERAL PHARMACY W/ HCPCS (ALT 636 FOR OP/ED): Performed by: STUDENT IN AN ORGANIZED HEALTH CARE EDUCATION/TRAINING PROGRAM

## 2025-05-19 PROCEDURE — 2500000004 HC RX 250 GENERAL PHARMACY W/ HCPCS (ALT 636 FOR OP/ED)

## 2025-05-19 PROCEDURE — 3600000003 HC OR TIME - INITIAL BASE CHARGE - PROCEDURE LEVEL THREE: Performed by: STUDENT IN AN ORGANIZED HEALTH CARE EDUCATION/TRAINING PROGRAM

## 2025-05-19 PROCEDURE — 7100000010 HC PHASE TWO TIME - EACH INCREMENTAL 1 MINUTE: Performed by: STUDENT IN AN ORGANIZED HEALTH CARE EDUCATION/TRAINING PROGRAM

## 2025-05-19 PROCEDURE — RXMED WILLOW AMBULATORY MEDICATION CHARGE

## 2025-05-19 PROCEDURE — 3700000001 HC GENERAL ANESTHESIA TIME - INITIAL BASE CHARGE: Performed by: STUDENT IN AN ORGANIZED HEALTH CARE EDUCATION/TRAINING PROGRAM

## 2025-05-19 PROCEDURE — 7100000002 HC RECOVERY ROOM TIME - EACH INCREMENTAL 1 MINUTE: Performed by: STUDENT IN AN ORGANIZED HEALTH CARE EDUCATION/TRAINING PROGRAM

## 2025-05-19 PROCEDURE — C1771 REP DEV, URINARY, W/SLING: HCPCS | Performed by: STUDENT IN AN ORGANIZED HEALTH CARE EDUCATION/TRAINING PROGRAM

## 2025-05-19 PROCEDURE — 57288 REPAIR BLADDER DEFECT: CPT | Performed by: STUDENT IN AN ORGANIZED HEALTH CARE EDUCATION/TRAINING PROGRAM

## 2025-05-19 PROCEDURE — 3600000008 HC OR TIME - EACH INCREMENTAL 1 MINUTE - PROCEDURE LEVEL THREE: Performed by: STUDENT IN AN ORGANIZED HEALTH CARE EDUCATION/TRAINING PROGRAM

## 2025-05-19 PROCEDURE — 2780000003 HC OR 278 NO HCPCS: Performed by: STUDENT IN AN ORGANIZED HEALTH CARE EDUCATION/TRAINING PROGRAM

## 2025-05-19 PROCEDURE — 3700000002 HC GENERAL ANESTHESIA TIME - EACH INCREMENTAL 1 MINUTE: Performed by: STUDENT IN AN ORGANIZED HEALTH CARE EDUCATION/TRAINING PROGRAM

## 2025-05-19 DEVICE — SLING, DESARA, BLUE TV, WITH 2.7 INTRODUCER: Type: IMPLANTABLE DEVICE | Site: BLADDER | Status: FUNCTIONAL

## 2025-05-19 RX ORDER — POLYETHYLENE GLYCOL 3350 17 G/17G
17 POWDER, FOR SOLUTION ORAL DAILY
Qty: 238 G | Refills: 0 | Status: SHIPPED | OUTPATIENT
Start: 2025-05-19 | End: 2025-06-18

## 2025-05-19 RX ORDER — TRAMADOL HYDROCHLORIDE 50 MG/1
50 TABLET, FILM COATED ORAL EVERY 8 HOURS PRN
Qty: 10 TABLET | Refills: 0 | Status: SHIPPED | OUTPATIENT
Start: 2025-05-19 | End: 2025-05-24

## 2025-05-19 RX ORDER — DOCUSATE SODIUM 100 MG/1
100 CAPSULE, LIQUID FILLED ORAL 2 TIMES DAILY
Qty: 60 CAPSULE | Refills: 0 | Status: SHIPPED | OUTPATIENT
Start: 2025-05-19 | End: 2025-06-18

## 2025-05-19 RX ORDER — PROPOFOL 10 MG/ML
INJECTION, EMULSION INTRAVENOUS AS NEEDED
Status: DISCONTINUED | OUTPATIENT
Start: 2025-05-19 | End: 2025-05-19

## 2025-05-19 RX ORDER — LIDOCAINE HYDROCHLORIDE AND EPINEPHRINE 10; 10 UG/ML; MG/ML
INJECTION, SOLUTION INFILTRATION; PERINEURAL AS NEEDED
Status: DISCONTINUED | OUTPATIENT
Start: 2025-05-19 | End: 2025-05-19 | Stop reason: HOSPADM

## 2025-05-19 RX ORDER — ALBUTEROL SULFATE 0.83 MG/ML
2.5 SOLUTION RESPIRATORY (INHALATION) ONCE AS NEEDED
Status: DISCONTINUED | OUTPATIENT
Start: 2025-05-19 | End: 2025-05-19 | Stop reason: HOSPADM

## 2025-05-19 RX ORDER — ADHESIVE BANDAGE
15 BANDAGE TOPICAL DAILY PRN
Qty: 360 ML | Refills: 0 | Status: SHIPPED | OUTPATIENT
Start: 2025-05-19

## 2025-05-19 RX ORDER — ONDANSETRON HYDROCHLORIDE 2 MG/ML
INJECTION, SOLUTION INTRAVENOUS AS NEEDED
Status: DISCONTINUED | OUTPATIENT
Start: 2025-05-19 | End: 2025-05-19

## 2025-05-19 RX ORDER — LIDOCAINE HCL/PF 100 MG/5ML
SYRINGE (ML) INTRAVENOUS AS NEEDED
Status: DISCONTINUED | OUTPATIENT
Start: 2025-05-19 | End: 2025-05-19

## 2025-05-19 RX ORDER — DIPHENHYDRAMINE HYDROCHLORIDE 50 MG/ML
12.5 INJECTION, SOLUTION INTRAMUSCULAR; INTRAVENOUS ONCE AS NEEDED
Status: DISCONTINUED | OUTPATIENT
Start: 2025-05-19 | End: 2025-05-19 | Stop reason: HOSPADM

## 2025-05-19 RX ORDER — KETOROLAC TROMETHAMINE 30 MG/ML
INJECTION, SOLUTION INTRAMUSCULAR; INTRAVENOUS AS NEEDED
Status: DISCONTINUED | OUTPATIENT
Start: 2025-05-19 | End: 2025-05-19

## 2025-05-19 RX ORDER — KETOROLAC TROMETHAMINE 10 MG/1
10 TABLET, FILM COATED ORAL EVERY 6 HOURS PRN
Qty: 20 TABLET | Refills: 0 | Status: SHIPPED | OUTPATIENT
Start: 2025-05-19

## 2025-05-19 RX ORDER — CEFAZOLIN SODIUM 2 G/100ML
2 INJECTION, SOLUTION INTRAVENOUS ONCE
Status: COMPLETED | OUTPATIENT
Start: 2025-05-19 | End: 2025-05-19

## 2025-05-19 RX ORDER — ONDANSETRON HYDROCHLORIDE 2 MG/ML
4 INJECTION, SOLUTION INTRAVENOUS ONCE AS NEEDED
Status: COMPLETED | OUTPATIENT
Start: 2025-05-19 | End: 2025-05-19

## 2025-05-19 RX ORDER — FENTANYL CITRATE 50 UG/ML
INJECTION, SOLUTION INTRAMUSCULAR; INTRAVENOUS AS NEEDED
Status: DISCONTINUED | OUTPATIENT
Start: 2025-05-19 | End: 2025-05-19

## 2025-05-19 RX ORDER — SODIUM CHLORIDE, SODIUM LACTATE, POTASSIUM CHLORIDE, CALCIUM CHLORIDE 600; 310; 30; 20 MG/100ML; MG/100ML; MG/100ML; MG/100ML
100 INJECTION, SOLUTION INTRAVENOUS CONTINUOUS
Status: DISCONTINUED | OUTPATIENT
Start: 2025-05-19 | End: 2025-05-19 | Stop reason: HOSPADM

## 2025-05-19 RX ORDER — ACETAMINOPHEN 325 MG/1
975 TABLET ORAL ONCE
Status: COMPLETED | OUTPATIENT
Start: 2025-05-19 | End: 2025-05-19

## 2025-05-19 RX ORDER — DROPERIDOL 2.5 MG/ML
0.62 INJECTION, SOLUTION INTRAMUSCULAR; INTRAVENOUS ONCE AS NEEDED
Status: DISCONTINUED | OUTPATIENT
Start: 2025-05-19 | End: 2025-05-19 | Stop reason: HOSPADM

## 2025-05-19 RX ORDER — PHENAZOPYRIDINE HYDROCHLORIDE 100 MG/1
200 TABLET, FILM COATED ORAL ONCE
Status: COMPLETED | OUTPATIENT
Start: 2025-05-19 | End: 2025-05-19

## 2025-05-19 RX ORDER — PHENYLEPHRINE HCL IN 0.9% NACL 0.4MG/10ML
SYRINGE (ML) INTRAVENOUS AS NEEDED
Status: DISCONTINUED | OUTPATIENT
Start: 2025-05-19 | End: 2025-05-19

## 2025-05-19 RX ORDER — OXYCODONE HYDROCHLORIDE 5 MG/1
5 TABLET ORAL EVERY 4 HOURS PRN
Status: DISCONTINUED | OUTPATIENT
Start: 2025-05-19 | End: 2025-05-19 | Stop reason: HOSPADM

## 2025-05-19 RX ORDER — SODIUM CHLORIDE, SODIUM LACTATE, POTASSIUM CHLORIDE, CALCIUM CHLORIDE 600; 310; 30; 20 MG/100ML; MG/100ML; MG/100ML; MG/100ML
20 INJECTION, SOLUTION INTRAVENOUS CONTINUOUS
Status: DISCONTINUED | OUTPATIENT
Start: 2025-05-19 | End: 2025-05-19 | Stop reason: HOSPADM

## 2025-05-19 RX ORDER — MEPERIDINE HYDROCHLORIDE 25 MG/ML
12.5 INJECTION INTRAMUSCULAR; INTRAVENOUS; SUBCUTANEOUS EVERY 10 MIN PRN
Status: DISCONTINUED | OUTPATIENT
Start: 2025-05-19 | End: 2025-05-19 | Stop reason: HOSPADM

## 2025-05-19 RX ORDER — CELECOXIB 100 MG/1
200 CAPSULE ORAL ONCE
Status: COMPLETED | OUTPATIENT
Start: 2025-05-19 | End: 2025-05-19

## 2025-05-19 RX ORDER — ACETAMINOPHEN 500 MG
1000 TABLET ORAL EVERY 6 HOURS PRN
Qty: 30 TABLET | Refills: 0 | Status: SHIPPED | OUTPATIENT
Start: 2025-05-19

## 2025-05-19 RX ADMIN — FENTANYL CITRATE 25 MCG: 50 INJECTION, SOLUTION INTRAMUSCULAR; INTRAVENOUS at 11:27

## 2025-05-19 RX ADMIN — PHENAZOPYRIDINE 200 MG: 100 TABLET ORAL at 10:56

## 2025-05-19 RX ADMIN — CEFAZOLIN SODIUM 2 G: 2 INJECTION, SOLUTION INTRAVENOUS at 11:25

## 2025-05-19 RX ADMIN — KETOROLAC TROMETHAMINE 15 MG: 30 INJECTION, SOLUTION INTRAMUSCULAR at 12:09

## 2025-05-19 RX ADMIN — FENTANYL CITRATE 25 MCG: 50 INJECTION, SOLUTION INTRAMUSCULAR; INTRAVENOUS at 11:40

## 2025-05-19 RX ADMIN — Medication 80 MCG: at 11:33

## 2025-05-19 RX ADMIN — PROPOFOL 50 MG: 10 INJECTION, EMULSION INTRAVENOUS at 11:39

## 2025-05-19 RX ADMIN — FENTANYL CITRATE 25 MCG: 50 INJECTION, SOLUTION INTRAMUSCULAR; INTRAVENOUS at 11:22

## 2025-05-19 RX ADMIN — ACETAMINOPHEN 975 MG: 325 TABLET ORAL at 10:56

## 2025-05-19 RX ADMIN — HYDROMORPHONE HYDROCHLORIDE 0.5 MG: 1 INJECTION, SOLUTION INTRAMUSCULAR; INTRAVENOUS; SUBCUTANEOUS at 12:40

## 2025-05-19 RX ADMIN — ONDANSETRON 4 MG: 2 INJECTION, SOLUTION INTRAMUSCULAR; INTRAVENOUS at 11:32

## 2025-05-19 RX ADMIN — LIDOCAINE HYDROCHLORIDE 50 MG: 20 INJECTION INTRAVENOUS at 11:22

## 2025-05-19 RX ADMIN — Medication 120 MCG: at 11:53

## 2025-05-19 RX ADMIN — Medication 120 MCG: at 11:51

## 2025-05-19 RX ADMIN — HYDROMORPHONE HYDROCHLORIDE 0.5 MG: 1 INJECTION, SOLUTION INTRAMUSCULAR; INTRAVENOUS; SUBCUTANEOUS at 12:28

## 2025-05-19 RX ADMIN — ONDANSETRON 4 MG: 2 INJECTION, SOLUTION INTRAMUSCULAR; INTRAVENOUS at 12:27

## 2025-05-19 RX ADMIN — PROPOFOL 150 MG: 10 INJECTION, EMULSION INTRAVENOUS at 11:22

## 2025-05-19 RX ADMIN — CELECOXIB 200 MG: 100 CAPSULE ORAL at 10:56

## 2025-05-19 RX ADMIN — SODIUM CHLORIDE, POTASSIUM CHLORIDE, SODIUM LACTATE AND CALCIUM CHLORIDE 20 ML/HR: 600; 310; 30; 20 INJECTION, SOLUTION INTRAVENOUS at 10:56

## 2025-05-19 RX ADMIN — Medication 80 MCG: at 11:44

## 2025-05-19 RX ADMIN — DEXAMETHASONE SODIUM PHOSPHATE 4 MG: 4 INJECTION INTRA-ARTICULAR; INTRALESIONAL; INTRAMUSCULAR; INTRAVENOUS; SOFT TISSUE at 11:32

## 2025-05-19 ASSESSMENT — PAIN SCALES - GENERAL
PAINLEVEL_OUTOF10: 3
PAINLEVEL_OUTOF10: 7
PAINLEVEL_OUTOF10: 7
PAINLEVEL_OUTOF10: 3
PAINLEVEL_OUTOF10: 7
PAINLEVEL_OUTOF10: 3

## 2025-05-19 ASSESSMENT — PAIN DESCRIPTION - DESCRIPTORS: DESCRIPTORS: PRESSURE

## 2025-05-19 ASSESSMENT — PAIN - FUNCTIONAL ASSESSMENT: PAIN_FUNCTIONAL_ASSESSMENT: 0-10

## 2025-05-19 NOTE — OP NOTE
DESARA SLING, AP REPAIR, COLPOSUSPENSION TRANSVAGINAL Operative Note     Date: 2025  OR Location: [facility] OR    Name: Danielle Mason, : 1960, Age: 64 y.o., MRN: [id number], Sex: female    Diagnosis  Pre-op Diagnosis      * KAELA (stress urinary incontinence, female) [N39.3]     * Rectocele [N81.6]     * Female genital prolapse, unspecified type [N81.9] Post-op Diagnosis     * KAELA (stress urinary incontinence, female) [N39.3]     * Rectocele [N81.6]     * Female genital prolapse, unspecified type [N81.9]     Procedures  DESARA SLING  98405 - ND SLING OPERATION STRESS INCONTINENCE    AP REPAIR  46364 - ND CMBND ANTERPOST COLPORRAPHY W/CYSTO    COLPOSUSPENSION TRANSVAGINAL  06914 - ND COLPOPEXY VAGINAL EXTRAPERITONEAL APPROACH      Surgeons      * Paulino Barron - Primary    Resident/Fellow/Other Assistant:  Surgeons and Role:  * No surgeons found with a matching role *    Staff:   Circulator: Andria Ramos Person: Mary  Surgical Assistant: Johanne Johnson Scrub: Luli Johnson Circulator: Irene    Anesthesia Staff: Anesthesiologist: Gurpreet Burkett DO  CRNA: BOB Whitt-BATSHEVA    Procedure Summary  Anesthesia: Anesthesia type not filed in the log.  ASA: ASA status not filed in the log.  Estimated Blood Loss: 20 mL  Intra-op Medications:   Administrations occurring from 1115 to 1300 on 25:   Medication Name Total Dose   lidocaine-epinephrine (Xylocaine W/EPI) 1 %-1:100,000 injection 9 mL   dexAMETHasone (Decadron) 4 mg/mL IV Syringe 2 mL 4 mg   fentaNYL (Sublimaze) injection 50 mcg/mL 75 mcg   ketorolac (Toradol) injection 30 mg 15 mg   lactated Ringer's infusion Cannot be calculated   lidocaine (cardiac) injection 2% prefilled syringe 50 mg   ondansetron (Zofran) 2 mg/mL injection 4 mg   phenylephrine 40 mcg/mL syringe 10 mL 400 mcg   propofol (Diprivan) injection 10 mg/mL 200 mg   ceFAZolin (Ancef) 2 g in dextrose (iso)  mL 2 g              Anesthesia Record               Intraprocedure I/O Totals           Intake    lactated Ringer's infusion 600.00 mL    Total Intake 600 mL          Specimen: No specimens collected              Drains and/or Catheters:   Urethral Catheter 16 Fr. (Active)       Tourniquet Times:         Implants:  Implants       Type Name Action Serial No.      Implant SLING, DESARA, BLUE TV, WITH 2.7 INTRODUCER - [id number] Implanted               Findings: stage 2 rectocele, normal bladder    Indications: Danielle Mason is an 64 y.o. female who is having surgery for KAELA (stress urinary incontinence, female) [N39.3]  Rectocele [N81.6]  Female genital prolapse, unspecified type [N81.9].     The patient was seen in the preoperative area. The risks, benefits, complications, treatment options, non-operative alternatives, expected recovery and outcomes were discussed with the patient. The possibilities of reaction to medication, pulmonary aspiration, injury to surrounding structures, bleeding, recurrent infection, the need for additional procedures, failure to diagnose a condition, and creating a complication requiring transfusion or operation were discussed with the patient. The patient concurred with the proposed plan, giving informed consent.  The site of surgery was properly noted/marked if necessary per policy. The patient has been actively warmed in preoperative area. Preoperative antibiotics have been ordered and given within 1 hours of incision. Venous thrombosis prophylaxis have been ordered including bilateral sequential compression devices    Procedure Details: Two sites were identified with each site 2.5 cm. from the midline at the level of the symphysis pubis. 1% lidocaine with epinephrine was injected vaginally under the urethra in the vaginal epithelium and bilaterally in the direction of two periurethral tunnels that were about to be created for the sling.  At this point, a 2 cm. sagittal excision was performed vaginally, beginning 1 cm. beneath the urethral meatus. Sharp  dissection was carried out bilaterally using Metzenbaum scissors and periurethral tunnels were created bilaterally.  A Hernandez catheter with a catheter guide was inserted into the bladder.   The TVT trocars were passed vaginally and retropubically with bladder deviation to the opposite side until it appeared suprapubically through a stab incision. The catheter was removed.  A cystoscopy was performed, which was entirely normal.  The bladder was drained.   The trocars were brought up entirely suprapubically and excised.  The end of the mesh was held in place with a Inge clamp.   The end of the mesh was held in place with a Inge clamp. Tension was adjusted on the mesh by drawing up on the suprapubic ends with a #8 Hegar dilator maintained between the tape and the urethra. The plastic sheaths were removed bilaterally.  The excess mesh was excised suprapubically.  The suprapubic sites were closed with interrupted stitches of 4-0 Vicryl suture.  The vaginal incision was closed with a running stitch of 0 Vicryl suture.     2 Allys clamps were used to grasp the lateral aspects of the posterior vagina. Using dilute vasopressin, the vaginal mucosa and perineum was injected. A scalpel was used to skin the perineum and posterior aspect of the vagina in a small vanna shape. The fibromuscular wall was then sharply dissected off the overlying vaginal mucosa. This dissection was taken out laterally on the left and right to the lateral sulci. Using several interrupted figure of 8 0-vicryl sutures, the fibromuscular was was re approximated in the midline. The bulbocavernosus muscles on the right and left were then re approximated by 0 vicryl at the posterior fourchette. The vaginal mucosa was trimmed and closed in a running locked 3-0 vicryl suture. Hemostasis was excellent. The vaginal opening could easily fit 2 gloved fingers.       The sponge and instrument counts were correct.    The patient tolerated the procedure well and  was transferred to the Recovery Room in excellent condition.      Evidence of Infection: No   Complications:  None; patient tolerated the procedure well.    Disposition: PACU - hemodynamically stable.  Condition: stable               Additional Details:     Attending Attestation: I was present and scrubbed for the entire procedure.    Paulino Barron  Phone Number: [phone]

## 2025-05-19 NOTE — ANESTHESIA PREPROCEDURE EVALUATION
Patient: Danielle Mason    Procedure Information       Anesthesia Start Date/Time: 05/19/25 1117    Procedures:       DESARA SLING - 71973, sling, desara      AP REPAIR - 56274, ap repair      COLPOSUSPENSION TRANSVAGINAL - sacrospinous fixation, case will take 1 hour    Location: [facility] OR 07 / [facility] OR    Surgeons: Paulino Barron MD            Relevant Problems   No relevant active problems       Clinical information reviewed:    Allergies  Meds   Med Hx   OB Status           NPO Detail:  NPO/Void Status  Carbohydrate Drink Given Prior to Surgery? : N  Date of Last Liquid: 05/18/25  Time of Last Liquid: 2200  Date of Last Solid: 05/18/25  Time of Last Solid: 2200  Last Intake Type: Clear fluids  Time of Last Void: 1030         Physical Exam    Airway  Mallampati: I  TM distance: >3 FB  Neck ROM: full  Mouth opening: 3 or more finger widths     Cardiovascular - normal exam   Dental    Pulmonary - normal exam   Abdominal            Anesthesia Plan    History of general anesthesia?: yes  History of complications of general anesthesia?: no    ASA 2     regional and general     intravenous induction   Anesthetic plan and risks discussed with patient.  Use of blood products discussed with patient who.    Plan discussed with attending and CRNA.

## 2025-05-19 NOTE — ANESTHESIA PROCEDURE NOTES
Airway  Date/Time: 5/19/2025 11:25 AM  Reason: elective    Airway not difficult    Staffing  Performed: CRNA   Authorized by: Gurpreet Burkett DO    Performed by: BOB Whitt-BATSHEVA  Patient location during procedure: OR    Patient Condition  Indications for airway management: anesthesia  Patient position: sniffing  Planned trial extubation  Sedation level: deep     Final Airway Details   Preoxygenated: yes  Final airway type: supraglottic airway  Successful airway: Supraglottic airway: IGEL.  Size: 4  Number of attempts at approach: 1  Number of other approaches attempted: 0    Additional Comments  Atraumatic LMA placement, dentition, gums, and lips intact.

## 2025-05-19 NOTE — ANESTHESIA POSTPROCEDURE EVALUATION
Patient: Danielle Mason    Procedure Summary       Date: 05/19/25 Room / Location: [facility] OR 07 / [facility]    Anesthesia Start: 1117 Anesthesia Stop: 1218    Procedures:       DESARA SLING      AP REPAIR      COLPOSUSPENSION TRANSVAGINAL Diagnosis:       KAELA (stress urinary incontinence, female)      Rectocele      Female genital prolapse, unspecified type      (KAELA (stress urinary incontinence, female) [N39.3])      (Rectocele [N81.6])      (Female genital prolapse, unspecified type [N81.9])    Surgeons: Paulino Barron MD Responsible Provider: Gurpreet Burkett DO    Anesthesia Type: general ASA Status: 2            Anesthesia Type: general    Vitals Value Taken Time   /65 05/19/25 12:47   Temp 36.5 °C (97.7 °F) 05/19/25 12:17   Pulse 55 05/19/25 12:47   Resp 12 05/19/25 12:47   SpO2 100 % 05/19/25 12:47       Anesthesia Post Evaluation    Patient location during evaluation: PACU  Patient participation: complete - patient participated  Level of consciousness: awake and alert  Pain management: adequate  Airway patency: patent  Cardiovascular status: acceptable  Respiratory status: acceptable  Hydration status: acceptable  Postoperative Nausea and Vomiting: none        No notable events documented.

## 2025-05-22 ENCOUNTER — OFFICE VISIT (OUTPATIENT)
Dept: UROLOGY | Facility: CLINIC | Age: 65
End: 2025-05-22
Payer: MEDICARE

## 2025-05-22 DIAGNOSIS — Z09 POSTOP CHECK: Primary | ICD-10-CM

## 2025-05-22 PROCEDURE — 99024 POSTOP FOLLOW-UP VISIT: CPT | Performed by: STUDENT IN AN ORGANIZED HEALTH CARE EDUCATION/TRAINING PROGRAM

## 2025-05-23 ENCOUNTER — LAB (OUTPATIENT)
Dept: LAB | Facility: HOSPITAL | Age: 65
End: 2025-05-23
Payer: MEDICARE

## 2025-05-23 DIAGNOSIS — R30.0 DYSURIA: ICD-10-CM

## 2025-05-23 PROCEDURE — 87086 URINE CULTURE/COLONY COUNT: CPT

## 2025-05-25 LAB — BACTERIA UR CULT: NO GROWTH

## 2025-06-26 ENCOUNTER — APPOINTMENT (OUTPATIENT)
Dept: UROLOGY | Facility: CLINIC | Age: 65
End: 2025-06-26
Payer: MEDICARE

## 2025-06-26 DIAGNOSIS — N32.81 OAB (OVERACTIVE BLADDER): Primary | ICD-10-CM

## 2025-06-26 DIAGNOSIS — N39.3 SUI (STRESS URINARY INCONTINENCE, FEMALE): ICD-10-CM

## 2025-06-26 PROCEDURE — 1126F AMNT PAIN NOTED NONE PRSNT: CPT | Performed by: STUDENT IN AN ORGANIZED HEALTH CARE EDUCATION/TRAINING PROGRAM

## 2025-06-26 PROCEDURE — 1159F MED LIST DOCD IN RCRD: CPT | Performed by: STUDENT IN AN ORGANIZED HEALTH CARE EDUCATION/TRAINING PROGRAM

## 2025-06-26 PROCEDURE — 1036F TOBACCO NON-USER: CPT | Performed by: STUDENT IN AN ORGANIZED HEALTH CARE EDUCATION/TRAINING PROGRAM

## 2025-06-26 PROCEDURE — 99214 OFFICE O/P EST MOD 30 MIN: CPT | Performed by: STUDENT IN AN ORGANIZED HEALTH CARE EDUCATION/TRAINING PROGRAM

## 2025-06-26 PROCEDURE — G2211 COMPLEX E/M VISIT ADD ON: HCPCS | Performed by: STUDENT IN AN ORGANIZED HEALTH CARE EDUCATION/TRAINING PROGRAM

## 2025-06-26 ASSESSMENT — PAIN SCALES - GENERAL: PAINLEVEL_OUTOF10: 0-NO PAIN

## 2025-06-26 NOTE — PROGRESS NOTES
"HISTORY OF PRESENT ILLNESS:  Danielle Mason is a 64 y.o. female who presents today for a follow up visit status post desara sling, AP repair, and colposuspension transvaginal on 5/19/25. She is doing okay. She is disappointed that she still has incontinence. She has more urge related incontinence. She does not have any stress incontinence.  She is doing well otherwise.          Past Medical History  She has a past medical history of Female genital prolapse, unspecified type, Hyperlipidemia, Hypertension, Lung granuloma (Multi), Lung nodules, OAB (overactive bladder), Rectocele, KAELA (stress urinary incontinence, female), Tinnitus, and Urinary tract infection.    Surgical History  She has a past surgical history that includes Other surgical history (11/10/2021); CT guided percutaneous biopsy lung (01/10/2022); Hysterectomy (winter 2022); Colonoscopy; Tonsillectomy; Bladder surgery; and Tubal ligation (04/1995).     Social History  She reports that she has never smoked. She has never used smokeless tobacco. She reports current alcohol use of about 2.0 standard drinks of alcohol per week. She reports that she does not use drugs.    Family History  Family History[1]     Allergies  Patient has no known allergies.      A comprehensive 10+ review of systems was negative except for: see hpi                          PHYSICAL EXAMINATION:  BP Readings from Last 3 Encounters:   05/19/25 121/80   03/12/25 126/76   01/08/25 140/84      Wt Readings from Last 3 Encounters:   05/19/25 87.8 kg (193 lb 9 oz)   05/02/25 89 kg (196 lb 3.4 oz)   03/12/25 88.9 kg (196 lb)      BMI: Estimated body mass index is 29.43 kg/m² as calculated from the following:    Height as of 5/19/25: 1.727 m (5' 8\").    Weight as of 5/19/25: 87.8 kg (193 lb 9 oz).  BSA: Estimated body surface area is 2.05 meters squared as calculated from the following:    Height as of 5/19/25: 1.727 m (5' 8\").    Weight as of 5/19/25: 87.8 kg (193 lb 9 oz).  HEENT: " Normocephalic, atraumatic, PER EOMI, nonicteric, trachea normal, thyroid normal, oropharynx normal.  CARDIAC: regular rate & rhythm, S1 & S2 normal.  No heaves, thrills, gallops or murmurs.  LUNGS: Clear to auscultation, no spinal or CV tenderness.  EXTREMITIES: No evidence of cyanosis, clubbing or edema.           Aa: -3 Ba: -3 C: -8  Ap: -3 Bp: -3 D: x  Gh: 3 pb 3 TVL 8       Assessment:  64 y.o. female presents with posterior and apical prolapse and mixed urinary incontinence stress time    Posterior compartment prolapse  -status post desara sling, AP repair, and colposuspension transvaginal on 5/19/25  -In [facility]E trial   -Doing well       SELAM:  -UDS showed KAELA,  -status post desara sling, AP repair, and colposuspension transvaginal on 5/19/25, no stress incontinence s/p, in BELIEVE   -Rx gemtesa, samples provided   -Explained that we could try physical therapy in the future if no improvement with medication   -Discussed limiting caffeine, carbonation, and limiting liquids to no more than 8 oz an hour   -Informational handouts provided for OAB/UUI   -we discussed botox vs sacral neuromodulation: both have similar efficacy 80% patients reports >50% improvement, botox associated with 5% risk of incomplete emptying, increase in UTI and will require re-injection in 6-9 months; and as early as 3 months. SNM is a staged procedure, 2 weeks apart, consisting first of lead implantation then internalization of IPG if there is improvement. Interstim is associated with lead migration, explantation, infection and bleeding, though risks are all <5%. We also discussed PTNS which is associated with success rates comparable to medical therapy but without side-effects without significant major morbidity.       Follow up in 6 weeks        All questions and concerns were answered and addressed.  The patient expressed understanding and agrees with the plan.     Paulino Barron MD    Scribe Attestation  By signing my name below, I,  Brock Perez   attest that this documentation has been prepared under the direction and in the presence of Paulino Barron MD.         [1]   Family History  Problem Relation Name Age of Onset    Arthritis Mother Kellie     Cancer Mother Kellie     Diabetes Mother Kellie     Hypertension Mother Kellie     Kidney disease Mother Kellie     Vision loss Mother Kellie     Thyroid cancer Mother Kellie     Atrial fibrillation Father Nixon     Hearing loss Father Nixon     Kidney disease Father Nixon     Stroke Father Nixon     Kidney cancer Daughter Mandy     Breast cancer Maternal Grandmother Chela 75    Heart disease Father Nixon

## 2025-07-03 ENCOUNTER — APPOINTMENT (OUTPATIENT)
Dept: UROLOGY | Facility: CLINIC | Age: 65
End: 2025-07-03
Payer: MEDICARE

## 2025-07-13 LAB
ATRIAL RATE: 68 BPM
P AXIS: 62 DEGREES
P OFFSET: 169 MS
P ONSET: 109 MS
PR INTERVAL: 234 MS
Q ONSET: 226 MS
QRS COUNT: 11 BEATS
QRS DURATION: 90 MS
QT INTERVAL: 414 MS
QTC CALCULATION(BAZETT): 440 MS
QTC FREDERICIA: 431 MS
R AXIS: -14 DEGREES
T AXIS: 20 DEGREES
T OFFSET: 433 MS
VENTRICULAR RATE: 68 BPM

## 2025-08-14 ENCOUNTER — APPOINTMENT (OUTPATIENT)
Dept: UROLOGY | Facility: CLINIC | Age: 65
End: 2025-08-14
Payer: MEDICARE

## 2025-08-28 ENCOUNTER — APPOINTMENT (OUTPATIENT)
Dept: UROLOGY | Facility: CLINIC | Age: 65
End: 2025-08-28
Payer: MEDICARE

## 2025-08-28 DIAGNOSIS — N32.81 OAB (OVERACTIVE BLADDER): Primary | ICD-10-CM

## 2025-08-28 PROCEDURE — 99214 OFFICE O/P EST MOD 30 MIN: CPT | Performed by: STUDENT IN AN ORGANIZED HEALTH CARE EDUCATION/TRAINING PROGRAM

## 2025-08-28 RX ORDER — VIBEGRON 75 MG/1
75 TABLET, FILM COATED ORAL DAILY
Qty: 90 TABLET | Refills: 3 | Status: SHIPPED | OUTPATIENT
Start: 2025-08-28 | End: 2026-08-23

## 2025-08-28 RX ORDER — VIBEGRON 75 MG/1
75 TABLET, FILM COATED ORAL DAILY
Qty: 84 TABLET | Refills: 0 | COMMUNITY
Start: 2025-08-28 | End: 2025-11-20

## 2025-09-04 ENCOUNTER — APPOINTMENT (OUTPATIENT)
Dept: PHARMACY | Facility: HOSPITAL | Age: 65
End: 2025-09-04
Payer: MEDICARE

## 2025-09-05 ENCOUNTER — PATIENT MESSAGE (OUTPATIENT)
Dept: PHARMACY | Facility: HOSPITAL | Age: 65
End: 2025-09-05
Payer: MEDICARE

## 2025-10-09 ENCOUNTER — APPOINTMENT (OUTPATIENT)
Dept: PHARMACY | Facility: HOSPITAL | Age: 65
End: 2025-10-09
Payer: MEDICARE

## 2025-11-20 ENCOUNTER — APPOINTMENT (OUTPATIENT)
Dept: UROLOGY | Facility: CLINIC | Age: 65
End: 2025-11-20
Payer: MEDICARE

## 2026-02-26 ENCOUNTER — APPOINTMENT (OUTPATIENT)
Dept: UROLOGY | Facility: CLINIC | Age: 66
End: 2026-02-26
Payer: MEDICARE

## (undated) DEVICE — POSITIONING, THE PINK PAD, PIGAZZI SYSTEM

## (undated) DEVICE — CAUTERY, PENCIL, PUSH BUTTON, SMOKE EVAC, 70MM

## (undated) DEVICE — SYRINGE, HYPODERMIC, CONTROL, LUER LOCK, 10 CC, PLASTIC, STERILE

## (undated) DEVICE — SUTURE, CTD, VICRYL, 2-0, UND, BR, CT-2

## (undated) DEVICE — TIP, SUCTION, YANKAUER, FLEXIBLE

## (undated) DEVICE — PAD, SANITARY, OBSTETRICAL, W/ADHESIVE STRIP, 10 IN NS

## (undated) DEVICE — STOPCOCK, SAN ANTONIO, W/MODIFIED FITTING

## (undated) DEVICE — Device

## (undated) DEVICE — IRRIGATION SET, CYSTOSCOPY, TURP, Y, CONTINUOUS, 81 IN

## (undated) DEVICE — DRAPE PACK, LAVH, W/ATTACHED LEGGINGS, W/POUCH, 100 X 114 IN, LF, STERILE

## (undated) DEVICE — SUTURE, VICRYL, 0, 27 IN, CT-2, UNDYED

## (undated) DEVICE — PREP TRAY, SKIN, DRY, W/GLOVES

## (undated) DEVICE — NEEDLE, SAFETY, 22 G X 1.5 IN

## (undated) DEVICE — ADHESIVE, SKIN, DERMABOND ADVANCED, 15CM, PEN-STYLE

## (undated) DEVICE — TRAY, FOLEY, ADVANCE, 16FR, SILICONE, W/STATLOCK

## (undated) DEVICE — DRAPE PACK, MINOR, CUSTOM, GEAUGA

## (undated) DEVICE — RETRACTOR, SURGICAL, RING, PLASTIC, DISPOSABLE

## (undated) DEVICE — SYRINGE, 10 CC, LUER LOCK

## (undated) DEVICE — STAY SET, SURGICAL , 5MM SHARP HOOK, CS/ 50

## (undated) DEVICE — PAD, GROUNDING, ELECTROSURGICAL, W/9 FT CABLE, POLYHESIVE II, ADULT, LF